# Patient Record
Sex: FEMALE | Race: WHITE | HISPANIC OR LATINO | Employment: OTHER | URBAN - METROPOLITAN AREA
[De-identification: names, ages, dates, MRNs, and addresses within clinical notes are randomized per-mention and may not be internally consistent; named-entity substitution may affect disease eponyms.]

---

## 2019-11-02 ENCOUNTER — HOSPITAL ENCOUNTER (EMERGENCY)
Facility: HOSPITAL | Age: 79
Discharge: HOME | End: 2019-11-02
Attending: EMERGENCY MEDICINE
Payer: MEDICARE

## 2019-11-02 VITALS
WEIGHT: 130 LBS | RESPIRATION RATE: 20 BRPM | BODY MASS INDEX: 27.29 KG/M2 | HEIGHT: 58 IN | DIASTOLIC BLOOD PRESSURE: 70 MMHG | OXYGEN SATURATION: 100 % | SYSTOLIC BLOOD PRESSURE: 133 MMHG | HEART RATE: 83 BPM | TEMPERATURE: 98 F

## 2019-11-02 DIAGNOSIS — N30.00 ACUTE CYSTITIS WITHOUT HEMATURIA: ICD-10-CM

## 2019-11-02 DIAGNOSIS — R05.9 COUGH: ICD-10-CM

## 2019-11-02 DIAGNOSIS — R06.02 SOB (SHORTNESS OF BREATH): Primary | ICD-10-CM

## 2019-11-02 DIAGNOSIS — J06.9 VIRAL URI: ICD-10-CM

## 2019-11-02 LAB
ALBUMIN SERPL BCP-MCNC: 4.3 G/DL (ref 3.5–5.2)
ALP SERPL-CCNC: 82 U/L (ref 55–135)
ALT SERPL W/O P-5'-P-CCNC: 19 U/L (ref 10–44)
ANION GAP SERPL CALC-SCNC: 13 MMOL/L (ref 8–16)
AST SERPL-CCNC: 25 U/L (ref 10–40)
BILIRUB SERPL-MCNC: 0.7 MG/DL (ref 0.1–1)
BILIRUB UR QL STRIP: NEGATIVE
BNP SERPL-MCNC: <10 PG/ML (ref 0–99)
BUN SERPL-MCNC: 10 MG/DL (ref 8–23)
CALCIUM SERPL-MCNC: 10.7 MG/DL (ref 8.7–10.5)
CHLORIDE SERPL-SCNC: 104 MMOL/L (ref 95–110)
CLARITY UR: CLEAR
CO2 SERPL-SCNC: 22 MMOL/L (ref 23–29)
COLOR UR: YELLOW
CREAT SERPL-MCNC: 0.8 MG/DL (ref 0.5–1.4)
ERYTHROCYTE [DISTWIDTH] IN BLOOD BY AUTOMATED COUNT: 13.3 % (ref 11.5–14.5)
EST. GFR  (AFRICAN AMERICAN): >60 ML/MIN/1.73 M^2
EST. GFR  (NON AFRICAN AMERICAN): >60 ML/MIN/1.73 M^2
GLUCOSE SERPL-MCNC: 119 MG/DL (ref 70–110)
GLUCOSE UR QL STRIP: NEGATIVE
HCT VFR BLD AUTO: 43.6 % (ref 37–48.5)
HGB BLD-MCNC: 14.3 G/DL (ref 12–16)
HGB UR QL STRIP: ABNORMAL
KETONES UR QL STRIP: NEGATIVE
LEUKOCYTE ESTERASE UR QL STRIP: ABNORMAL
MCH RBC QN AUTO: 29.1 PG (ref 27–31)
MCHC RBC AUTO-ENTMCNC: 32.8 G/DL (ref 32–36)
MCV RBC AUTO: 89 FL (ref 82–98)
MICROSCOPIC COMMENT: ABNORMAL
NITRITE UR QL STRIP: NEGATIVE
NON-SQ EPI CELLS #/AREA URNS HPF: 2 /HPF
PH UR STRIP: 7 [PH] (ref 5–8)
PLATELET # BLD AUTO: 292 K/UL (ref 150–350)
PMV BLD AUTO: 10.2 FL (ref 9.2–12.9)
POTASSIUM SERPL-SCNC: 4.1 MMOL/L (ref 3.5–5.1)
PROT SERPL-MCNC: 7.6 G/DL (ref 6–8.4)
PROT UR QL STRIP: NEGATIVE
RBC # BLD AUTO: 4.92 M/UL (ref 4–5.4)
RBC #/AREA URNS HPF: 1 /HPF (ref 0–4)
SODIUM SERPL-SCNC: 139 MMOL/L (ref 136–145)
SP GR UR STRIP: <=1.005 (ref 1–1.03)
SQUAMOUS #/AREA URNS HPF: 2 /HPF
TROPONIN I SERPL DL<=0.01 NG/ML-MCNC: 0.01 NG/ML (ref 0–0.03)
URN SPEC COLLECT METH UR: ABNORMAL
UROBILINOGEN UR STRIP-ACNC: NEGATIVE EU/DL
WBC # BLD AUTO: 5.82 K/UL (ref 3.9–12.7)
WBC #/AREA URNS HPF: 30 /HPF (ref 0–5)

## 2019-11-02 PROCEDURE — 80053 COMPREHEN METABOLIC PANEL: CPT

## 2019-11-02 PROCEDURE — 85027 COMPLETE CBC AUTOMATED: CPT

## 2019-11-02 PROCEDURE — 87186 SC STD MICRODIL/AGAR DIL: CPT

## 2019-11-02 PROCEDURE — 25000242 PHARM REV CODE 250 ALT 637 W/ HCPCS: Performed by: EMERGENCY MEDICINE

## 2019-11-02 PROCEDURE — 81000 URINALYSIS NONAUTO W/SCOPE: CPT

## 2019-11-02 PROCEDURE — 83880 ASSAY OF NATRIURETIC PEPTIDE: CPT

## 2019-11-02 PROCEDURE — 93010 ELECTROCARDIOGRAM REPORT: CPT | Mod: ,,, | Performed by: STUDENT IN AN ORGANIZED HEALTH CARE EDUCATION/TRAINING PROGRAM

## 2019-11-02 PROCEDURE — 84484 ASSAY OF TROPONIN QUANT: CPT

## 2019-11-02 PROCEDURE — 87077 CULTURE AEROBIC IDENTIFY: CPT

## 2019-11-02 PROCEDURE — 93005 ELECTROCARDIOGRAM TRACING: CPT

## 2019-11-02 PROCEDURE — 99285 EMERGENCY DEPT VISIT HI MDM: CPT | Mod: 25

## 2019-11-02 PROCEDURE — 94640 AIRWAY INHALATION TREATMENT: CPT

## 2019-11-02 PROCEDURE — 36000 PLACE NEEDLE IN VEIN: CPT

## 2019-11-02 PROCEDURE — 87086 URINE CULTURE/COLONY COUNT: CPT

## 2019-11-02 PROCEDURE — 87088 URINE BACTERIA CULTURE: CPT

## 2019-11-02 PROCEDURE — 93010 EKG 12-LEAD: ICD-10-PCS | Mod: ,,, | Performed by: STUDENT IN AN ORGANIZED HEALTH CARE EDUCATION/TRAINING PROGRAM

## 2019-11-02 RX ORDER — LOSARTAN POTASSIUM 100 MG/1
100 TABLET ORAL DAILY
COMMUNITY
End: 2022-11-06

## 2019-11-02 RX ORDER — ALBUTEROL SULFATE 2.5 MG/.5ML
5 SOLUTION RESPIRATORY (INHALATION)
Status: COMPLETED | OUTPATIENT
Start: 2019-11-02 | End: 2019-11-02

## 2019-11-02 RX ORDER — ALBUTEROL SULFATE 90 UG/1
1-2 AEROSOL, METERED RESPIRATORY (INHALATION) EVERY 6 HOURS PRN
Qty: 1 INHALER | Refills: 0 | Status: SHIPPED | OUTPATIENT
Start: 2019-11-02 | End: 2019-11-05

## 2019-11-02 RX ORDER — CIPROFLOXACIN 500 MG/1
500 TABLET ORAL 2 TIMES DAILY
Qty: 14 TABLET | Refills: 0 | Status: SHIPPED | OUTPATIENT
Start: 2019-11-02 | End: 2019-11-09

## 2019-11-02 RX ORDER — CHOLECALCIFEROL (VITAMIN D3) 25 MCG
1000 TABLET ORAL DAILY
COMMUNITY

## 2019-11-02 RX ADMIN — ALBUTEROL SULFATE 5 MG: 2.5 SOLUTION RESPIRATORY (INHALATION) at 05:11

## 2019-11-02 NOTE — ED NOTES
79 year old female presents to ed cc of sob x 3 days patient endorses intermittent dizziness denies cp patient awake alert ox4 in no acute distress at this time

## 2019-11-02 NOTE — ED PROVIDER NOTES
"Encounter Date: 11/2/2019    SCRIBE #1 NOTE: I, Avis Lawson, am scribing for, and in the presence of,  Dr. Coleman . I have scribed the entire note.       History     Chief Complaint   Patient presents with    Shortness of Breath     pt complains of weakness, SOB, and generalized body aches x 2 weeks, + nausea, pt reports recent Kidney infection"      Millie Pineda is a 79 y.o. female who  has a past medical history of Hypertension.    The patient presents to the ED due to worsening SOB over the past three days. She endorses associated fever, cough, "shakes", decreased appetite, decreased sleep, lower abdominal pain, and dysuria.   She denies any CP, nausea, vomiting, back/flank pain. She denies prior history of similar symptoms. She reports she was recently diagnosed with a "bacterial infection" and has completed a course of ABX.   Her daughter presents with her, who later adds she does get very anxious at times.    The history is provided by the patient.     Review of patient's allergies indicates:   Allergen Reactions    Pcn [penicillins] Other (See Comments)     " gives me headache"      Past Medical History:   Diagnosis Date    Hypertension      No past surgical history on file.  No family history on file.  Social History     Tobacco Use    Smoking status: Not on file   Substance Use Topics    Alcohol use: Not on file    Drug use: Not on file     Review of Systems   Constitutional: Positive for appetite change and fever. Negative for chills.   HENT: Negative for sore throat.    Respiratory: Positive for cough and shortness of breath.    Cardiovascular: Negative for chest pain.   Gastrointestinal: Positive for abdominal pain and nausea. Negative for vomiting.   Genitourinary: Negative for dysuria, frequency and urgency.   Musculoskeletal: Positive for myalgias. Negative for back pain.   Skin: Negative for rash and wound.   Neurological: Positive for tremors and weakness. Negative for syncope. "   Hematological: Does not bruise/bleed easily.   Psychiatric/Behavioral: Positive for sleep disturbance. Negative for agitation, behavioral problems and confusion. The patient is nervous/anxious.        Physical Exam     Initial Vitals [11/02/19 1654]   BP Pulse Resp Temp SpO2   127/70 95 19 97.5 °F (36.4 °C) 97 %      MAP       --         Physical Exam    Nursing note and vitals reviewed.  Constitutional: She appears well-developed and well-nourished. She is not diaphoretic. No distress.   Appears anxious    HENT:   Head: Normocephalic and atraumatic.   Mouth/Throat: Oropharynx is clear and moist.   Eyes: EOM are normal. Pupils are equal, round, and reactive to light.   Neck: No tracheal deviation present.   Cardiovascular: Normal rate, regular rhythm, normal heart sounds and intact distal pulses.   Pulmonary/Chest: Effort normal and breath sounds normal. No stridor. No respiratory distress. She has no decreased breath sounds. She has no wheezes. She has no rhonchi. She has no rales.   Respiratory effort is normal, but upon auscultation patient begins hyperventilating.   Lungs clear bilaterally.   Abdominal: Soft. Bowel sounds are normal. She exhibits no distension and no mass. There is no tenderness.   Musculoskeletal: Normal range of motion. She exhibits no edema.   No lower extremity edema    Neurological: She is alert and oriented to person, place, and time. She has normal strength. No cranial nerve deficit or sensory deficit.   Skin: Skin is warm and dry. Capillary refill takes less than 2 seconds. No pallor.   Psychiatric: Her behavior is normal. Thought content normal. Her mood appears anxious. Her speech is rapid and/or pressured.         ED Course   Procedures  Labs Reviewed   COMPREHENSIVE METABOLIC PANEL - Abnormal; Notable for the following components:       Result Value    CO2 22 (*)     Glucose 119 (*)     Calcium 10.7 (*)     All other components within normal limits   URINALYSIS, REFLEX TO URINE  CULTURE - Abnormal; Notable for the following components:    Specific Gravity, UA <=1.005 (*)     Occult Blood UA Trace (*)     Leukocytes, UA 3+ (*)     All other components within normal limits    Narrative:     Preferred Collection Type->Urine, Clean Catch   URINALYSIS MICROSCOPIC - Abnormal; Notable for the following components:    WBC, UA 30 (*)     Non-Squam Epith 2 (*)     All other components within normal limits    Narrative:     Preferred Collection Type->Urine, Clean Catch   CULTURE, URINE   CBC WITHOUT DIFFERENTIAL   B-TYPE NATRIURETIC PEPTIDE   TROPONIN I     EKG Readings: (Independently Interpreted)   NSR: rate of 86 bpm; no ST changes; no ischemia; normal intervals ; no prior EKG for comparison          X-Rays:   Independently Interpreted Readings:   Other Readings:  Reviewed by myself, read by radiology.     Imaging Results          X-Ray Chest AP Portable (Final result)  Result time 11/02/19 17:58:35    Final result by Jerry Gutierrez MD (11/02/19 17:58:35)                 Impression:      No acute process.      Electronically signed by: Jerry Gutierrez MD  Date:    11/02/2019  Time:    17:58             Narrative:    EXAMINATION:  XR CHEST AP PORTABLE    CLINICAL HISTORY:  shortness of breath;    TECHNIQUE:  Single frontal view of the chest was performed.    COMPARISON:  None    FINDINGS:  The trachea is unremarkable.  The cardiomediastinal silhouette is within normal limits.  The hilar structures are unremarkable.  The hemidiaphragms are unremarkable.  There is no evidence of free air beneath the hemidiaphragms.  There are no pleural effusions.  There is no evidence of a pneumothorax.  There is no evidence of pneumomediastinum.  No airspace opacity is present.  There are degenerative changes in the osseous structures.                              Medical Decision Making:   Differential Diagnosis:   Differential Diagnosis includes, but is not limited to:  PE, MI/ACS, pneumothorax, pericardial  effusion/tamonade, pneumonia, lung abscess, pericarditis/myocarditis, pleural effusion, lung mass, CHF exacerbation, asthma exacerbation, COPD exacerbation, aspirated/ingested foreign body, airway obstruction, CO poisoning, anemia, metabolic derangement, allergy/atopy, influenza, viral URI, viral syndrome.    Clinical Tests:   Lab Tests: Ordered and Reviewed  Radiological Study: Ordered and Reviewed  Medical Tests: Ordered and Reviewed                   ED Course as of Nov 03 1635   Sat Nov 02, 2019   1727 79-year-old female with history of hypertension on losartan presents to ED due to several days of shortness of breath, fatigue, decreased appetite, and decreased sleep.  Denies any history of asthma, COPD, or cardiac history.  On arrival, vitals unremarkable, exam benign, patient very anxious appearing, and hyperventilating on exam.  Will obtain cardiac evaluation, EKG, CXR, basic labs, and continue to monitor.    [SS]   1958 EKG without ischemia or arrhythmia.  CXR clear.  Labs unremarkable, BNP and troponin normal.  UA does reveal moderate UTI, greater than 30 WBCs.  Patient recently completed a course of Bactrim, and has a documented penicillin allergy.  Will prescribe ciprofloxacin.  Will also provide albuterol inhaler for possible viral URI.  ACS/PE highly unlikely given normal vitals and reassuring workup. Given patient's history of anxiety, suspect possible component of anxiety contributing to patient's symptoms.  Recommend close follow-up with PCP for further evaluation and management.      [SS]      ED Course User Index  [SS] Jorge Coleman MD     Clinical Impression:       ICD-10-CM ICD-9-CM   1. SOB (shortness of breath) R06.02 786.05   2. Acute cystitis without hematuria N30.00 595.0   3. Cough R05 786.2   4. Viral URI J06.9 465.9         Disposition:   Disposition: Discharged  Condition: Stable           I, Dr. Jorge Coleman, personally performed the services described in this documentation. All  medical record entries made by the scribe were at my direction and in my presence.  I have reviewed the chart and agree that the record reflects my personal performance and is accurate and complete.     Jorge Coleman MD.             Jorge Coleman MD  11/03/19 1738

## 2019-11-02 NOTE — ED NOTES
APPEARANCE: Alert, oriented and in no acute distress.  CARDIAC: Normal rate and rhythm, no murmur heard.   PERIPHERAL VASCULAR: peripheral pulses present. Normal cap refill. No edema. Warm to touch.    GASTRO: soft, bowel sounds normal, no tenderness, no abdominal distention.  MUSC: Full ROM. No bony tenderness or soft tissue tenderness. No obvious deformity.  SKIN: Skin is warm and dry, normal skin turgor, mucous membranes moist.  NEURO: 5/5 strength major flexors/extensors bilaterally. Sensory intact to light touch bilaterally. [place] coma scale: eyes open spontaneously-4, oriented & converses-5, obeys commands-6. No neurological abnormalities.   MENTAL STATUS: awake, alert and aware of environment.  EYE: PERRL, both eyes: pupils brisk and reactive to light. Normal size.  ENT: EARS: no obvious drainage. NOSE: no active bleeding.

## 2019-11-05 LAB — BACTERIA UR CULT: ABNORMAL

## 2022-05-14 ENCOUNTER — HOSPITAL ENCOUNTER (EMERGENCY)
Facility: HOSPITAL | Age: 82
Discharge: HOME | End: 2022-05-14
Attending: EMERGENCY MEDICINE
Payer: MEDICARE

## 2022-05-14 VITALS
OXYGEN SATURATION: 98 % | TEMPERATURE: 98 F | SYSTOLIC BLOOD PRESSURE: 125 MMHG | RESPIRATION RATE: 22 BRPM | BODY MASS INDEX: 29.26 KG/M2 | DIASTOLIC BLOOD PRESSURE: 78 MMHG | WEIGHT: 140 LBS | HEART RATE: 80 BPM

## 2022-05-14 DIAGNOSIS — R05.9 COUGH: ICD-10-CM

## 2022-05-14 DIAGNOSIS — J06.9 UPPER RESPIRATORY TRACT INFECTION, UNSPECIFIED TYPE: Primary | ICD-10-CM

## 2022-05-14 LAB
CTP QC/QA: YES
INFLUENZA A, MOLECULAR: NEGATIVE
INFLUENZA B, MOLECULAR: NEGATIVE
SARS-COV-2 RDRP RESP QL NAA+PROBE: NEGATIVE
SPECIMEN SOURCE: NORMAL

## 2022-05-14 PROCEDURE — 87502 INFLUENZA DNA AMP PROBE: CPT | Performed by: PHYSICIAN ASSISTANT

## 2022-05-14 PROCEDURE — 99284 EMERGENCY DEPT VISIT MOD MDM: CPT | Mod: 25

## 2022-05-14 PROCEDURE — 87502 INFLUENZA DNA AMP PROBE: CPT

## 2022-05-14 PROCEDURE — 25000003 PHARM REV CODE 250: Performed by: EMERGENCY MEDICINE

## 2022-05-14 PROCEDURE — U0002 COVID-19 LAB TEST NON-CDC: HCPCS | Performed by: PHYSICIAN ASSISTANT

## 2022-05-14 RX ORDER — BENZONATATE 100 MG/1
100 CAPSULE ORAL 3 TIMES DAILY PRN
Qty: 21 CAPSULE | Refills: 0 | Status: SHIPPED | OUTPATIENT
Start: 2022-05-14 | End: 2022-05-21

## 2022-05-14 RX ORDER — IPRATROPIUM BROMIDE 42 UG/1
2 SPRAY, METERED NASAL 4 TIMES DAILY PRN
Qty: 15 ML | Refills: 0 | Status: SHIPPED | OUTPATIENT
Start: 2022-05-14 | End: 2022-11-06

## 2022-05-14 RX ORDER — AZITHROMYCIN 250 MG/1
250 TABLET, FILM COATED ORAL DAILY
Qty: 6 TABLET | Refills: 0 | Status: SHIPPED | OUTPATIENT
Start: 2022-05-14 | End: 2022-11-06

## 2022-05-14 RX ORDER — ACETAMINOPHEN 500 MG
500 TABLET ORAL
Status: COMPLETED | OUTPATIENT
Start: 2022-05-14 | End: 2022-05-14

## 2022-05-14 RX ORDER — ACETAMINOPHEN 500 MG
500 TABLET ORAL EVERY 6 HOURS PRN
Qty: 20 TABLET | Refills: 0 | Status: SHIPPED | OUTPATIENT
Start: 2022-05-14

## 2022-05-14 RX ORDER — BENZONATATE 100 MG/1
100 CAPSULE ORAL ONCE
Status: COMPLETED | OUTPATIENT
Start: 2022-05-14 | End: 2022-05-14

## 2022-05-14 RX ADMIN — BENZONATATE 100 MG: 100 CAPSULE ORAL at 12:05

## 2022-05-14 RX ADMIN — ACETAMINOPHEN 500 MG: 500 TABLET ORAL at 12:05

## 2022-05-14 NOTE — ED PROVIDER NOTES
"Encounter Date: 5/14/2022       History     Chief Complaint   Patient presents with    COVID-19 Concerns     Pt c/o headache, cough, nasal irritation and chills x2 days     Millie Pineda is a 81 y.o. female who  has a past medical history of Hypertension.    The patient presents to the ED due to cough congestion headache for the past 3 days.  Patient has been taking Tylenol.  She states she took Tylenol last night but none today.  She had chills and fever last night however she is afebrile here.  She reports no shortness of breath or pain but reports nausea.  She is vaccinated against COVID however states that at her [facility] there are many unvaccinated people there.  No other concerns noted today.    The history is provided by the patient (A  was offered at no cost and declined.).     Review of patient's allergies indicates:   Allergen Reactions    Pcn [penicillins] Other (See Comments)     " gives me headache"      Past Medical History:   Diagnosis Date    Hypertension      No past surgical history on file.  No family history on file.     Review of Systems   Constitutional: Positive for chills and fever.   HENT: Positive for congestion. Negative for sore throat.    Respiratory: Positive for cough. Negative for shortness of breath.    Cardiovascular: Negative for chest pain.   Gastrointestinal: Positive for nausea. Negative for vomiting.   Genitourinary: Negative for dysuria, frequency and urgency.   Musculoskeletal: Negative for back pain, neck pain and neck stiffness.   Skin: Negative for rash and wound.   Neurological: Negative for syncope and weakness.   Hematological: Does not bruise/bleed easily.   Psychiatric/Behavioral: Negative for agitation, behavioral problems and confusion.       Physical Exam     Initial Vitals [05/14/22 1051]   BP Pulse Resp Temp SpO2   127/69 88 (!) 28 97.8 °F (36.6 °C) 96 %      MAP       --         Physical Exam    Nursing note and vitals " reviewed.  Constitutional: She appears well-developed and well-nourished. She is not diaphoretic. No distress.   HENT:   Head: Normocephalic and atraumatic.   Mouth/Throat: Oropharynx is clear and moist.   Eyes: EOM are normal. Pupils are equal, round, and reactive to light.   Neck: No tracheal deviation present.   Cardiovascular: Normal rate, regular rhythm, normal heart sounds and intact distal pulses.   Pulmonary/Chest: Breath sounds normal. No stridor. No respiratory distress. She has no wheezes.   Abdominal: Abdomen is soft. Bowel sounds are normal. She exhibits no distension and no mass. There is no abdominal tenderness.   Musculoskeletal:         General: No edema. Normal range of motion.     Neurological: She is alert and oriented to person, place, and time. No cranial nerve deficit or sensory deficit.   Skin: Skin is warm and dry. Capillary refill takes less than 2 seconds. No rash noted.   Psychiatric: She has a normal mood and affect. Her behavior is normal. Thought content normal.         ED Course   Procedures  Labs Reviewed   INFLUENZA A & B BY MOLECULAR   SARS-COV-2 RDRP GENE          Imaging Results          X-Ray Chest 1 View (Final result)  Result time 05/14/22 13:21:51    Final result by Vasile Bazan MD (05/14/22 13:21:51)                 Impression:      1. Pulmonary findings may reflect edema or other nonspecific pneumonitis noting shallow inspiratory effort.  Correlation is advised.      Electronically signed by: Vasile Bazan MD  Date:    05/14/2022  Time:    13:21             Narrative:    EXAMINATION:  XR CHEST 1 VIEW    CLINICAL HISTORY:  Cough, unspecified    TECHNIQUE:  Single frontal view of the chest was performed.    COMPARISON:  11/02/2019    FINDINGS:  The cardiomediastinal silhouette is not enlarged, magnified by technique..  There is no pleural effusion.  The trachea is midline.  The lungs are symmetrically expanded bilaterally with coarse interstitial attenuation.  No  large focal consolidation seen.  There is no pneumothorax.  The osseous structures are remarkable for degenerative changes.  Surgical change projects over the right upper quadrant..                                 Medications   benzonatate capsule 100 mg (100 mg Oral Given 5/14/22 1240)   acetaminophen tablet 500 mg (500 mg Oral Given 5/14/22 1240)     Medical Decision Making:   Differential Diagnosis:   Differential Diagnosis includes, but is not limited to:  Viral URI, Strep pharyngitis, viral pharyngitis, foreign body aspiration/ingestion, bronchitis, asthma exacerbation, CHF exacerbation, COPD exacerbation, allergy/atopy, influenza, pertussis, PE, pneumonia, lung abscess, fungal infection, TB, epiglottitis.    ED Management:  81-year-old female presenting with coryza cough.  Vital signs are stable.  She has no shortness of breath.  Chest x-ray demonstrates possible pneumonitis.  COVID and flu swabs are negative.  She appears stable for discharge today.  Discussed close follow-up with patient's PCP.  Return precautions for worsening symptoms shortness of breath fevers weakness or any other concerns.    After taking into careful account the historical factors and physical exam findings of the patient's presentation today, in conjunction with the empirical and objective data obtained on ED workup, no acute emergent medical condition has been identified. The patient appears to be low risk for an emergent medical condition and I feel it is safe and appropriate at this time for the patient to be discharged to follow-up as detailed in their discharge instructions for reevaluation and possible continued outpatient workup and management. I have discussed the specifics of the workup with the patient and the patient has verbalized understanding of the details of the workup, the diagnosis, the treatment plan, and the need for outpatient follow-up.  Although the patient has no emergent etiology today this does not preclude  the development of an emergent condition so in addition, I have advised the patient that they can return to the ED and/or activate EMS at any time with worsening of their symptoms, change of their symptoms, or with any other medical complaint.  The patient remained comfortable and stable during their visit in the ED.  Discharge and follow-up instructions discussed with the patient who expressed understanding and willingness to comply with my recommendations.                        Clinical Impression:   Final diagnoses:  [R05.9] Cough  [J06.9] Upper respiratory tract infection, unspecified type (Primary)          ED Disposition Condition    Discharge Stable        ED Prescriptions     Medication Sig Dispense Start Date End Date Auth. Provider    benzonatate (TESSALON) 100 MG capsule Take 1 capsule (100 mg total) by mouth 3 (three) times daily as needed for Cough. 21 capsule 5/14/2022 5/21/2022 Evan Arguello Jr., MD    azithromycin (Z-NIMISHA) 250 MG tablet Take 1 tablet (250 mg total) by mouth once daily. Take first 2 tablets together, then 1 every day until finished. 6 tablet 5/14/2022  Evan Arguello Jr., MD    ipratropium (ATROVENT) 42 mcg (0.06 %) nasal spray 2 sprays by Nasal route 4 (four) times daily as needed for Rhinitis. 15 mL 5/14/2022  Evan Arguello Jr., MD    acetaminophen (TYLENOL) 500 MG tablet Take 1 tablet (500 mg total) by mouth every 6 (six) hours as needed for Pain. 20 tablet 5/14/2022  Evan Arguello Jr., MD        Follow-up Information     Follow up With Specialties Details Why Contact Info    Chacorta Santana MD Internal Medicine In 3 days  [address] [facility]  [facility]  [place] [id number]  [phone]          Portions of this note were dictated using voice recognition software and may contain dictation related errors in spelling/grammar/syntax not found on text review       Evan Arguello Jr., MD  05/15/22 [id number]

## 2022-05-14 NOTE — ED NOTES
Pt in room 3 from waiting room. Pt has headache, cough and congestion for a few days. Pt dressed in gown and able to speak in complete sentences without shortness of breath. Head to toe assessment completed, plan of care reviewed, call bell within reach.

## 2022-05-14 NOTE — ED NOTES
Pt given discharge, follow up  Instructions and prescriptions. Pt verbalized understanding. Pt ambulated out of ER with steady gait in stable condition.

## 2022-08-01 ENCOUNTER — HOSPITAL ENCOUNTER (OUTPATIENT)
Dept: RADIOLOGY | Facility: HOSPITAL | Age: 82
Discharge: HOME | End: 2022-08-01
Attending: PODIATRIST
Payer: MEDICARE

## 2022-08-01 ENCOUNTER — OFFICE VISIT (OUTPATIENT)
Dept: PODIATRY | Facility: CLINIC | Age: 82
End: 2022-08-01
Payer: MEDICARE

## 2022-08-01 VITALS
DIASTOLIC BLOOD PRESSURE: 71 MMHG | SYSTOLIC BLOOD PRESSURE: 130 MMHG | HEART RATE: 93 BPM | BODY MASS INDEX: 29.26 KG/M2 | HEIGHT: 58 IN

## 2022-08-01 DIAGNOSIS — M79.671 PAIN OF RIGHT HEEL: ICD-10-CM

## 2022-08-01 DIAGNOSIS — M72.2 PLANTAR FASCIITIS, RIGHT: ICD-10-CM

## 2022-08-01 DIAGNOSIS — M79.671 PAIN OF RIGHT HEEL: Primary | ICD-10-CM

## 2022-08-01 DIAGNOSIS — B07.0 PLANTAR WART, RIGHT FOOT: ICD-10-CM

## 2022-08-01 PROCEDURE — 1159F PR MEDICATION LIST DOCUMENTED IN MEDICAL RECORD: ICD-10-PCS | Mod: CPTII,S$GLB,, | Performed by: PODIATRIST

## 2022-08-01 PROCEDURE — 1160F RVW MEDS BY RX/DR IN RCRD: CPT | Mod: CPTII,S$GLB,, | Performed by: PODIATRIST

## 2022-08-01 PROCEDURE — 99999 PR PBB SHADOW E&M-EST. PATIENT-LVL III: ICD-10-PCS | Mod: PBBFAC,,, | Performed by: PODIATRIST

## 2022-08-01 PROCEDURE — 99203 OFFICE O/P NEW LOW 30 MIN: CPT | Mod: 25,S$GLB,, | Performed by: PODIATRIST

## 2022-08-01 PROCEDURE — 20550 NJX 1 TENDON SHEATH/LIGAMENT: CPT | Mod: RT,S$GLB,, | Performed by: PODIATRIST

## 2022-08-01 PROCEDURE — 73650 X-RAY EXAM OF HEEL: CPT | Mod: TC,PN,RT

## 2022-08-01 PROCEDURE — 3075F SYST BP GE 130 - 139MM HG: CPT | Mod: CPTII,S$GLB,, | Performed by: PODIATRIST

## 2022-08-01 PROCEDURE — 3078F PR MOST RECENT DIASTOLIC BLOOD PRESSURE < 80 MM HG: ICD-10-PCS | Mod: CPTII,S$GLB,, | Performed by: PODIATRIST

## 2022-08-01 PROCEDURE — 3075F PR MOST RECENT SYSTOLIC BLOOD PRESS GE 130-139MM HG: ICD-10-PCS | Mod: CPTII,S$GLB,, | Performed by: PODIATRIST

## 2022-08-01 PROCEDURE — 99203 PR OFFICE/OUTPT VISIT, NEW, LEVL III, 30-44 MIN: ICD-10-PCS | Mod: 25,S$GLB,, | Performed by: PODIATRIST

## 2022-08-01 PROCEDURE — 20550 PR INJECT TENDON SHEATH/LIGAMENT: ICD-10-PCS | Mod: RT,S$GLB,, | Performed by: PODIATRIST

## 2022-08-01 PROCEDURE — 1125F PR PAIN SEVERITY QUANTIFIED, PAIN PRESENT: ICD-10-PCS | Mod: CPTII,S$GLB,, | Performed by: PODIATRIST

## 2022-08-01 PROCEDURE — 1125F AMNT PAIN NOTED PAIN PRSNT: CPT | Mod: CPTII,S$GLB,, | Performed by: PODIATRIST

## 2022-08-01 PROCEDURE — 1159F MED LIST DOCD IN RCRD: CPT | Mod: CPTII,S$GLB,, | Performed by: PODIATRIST

## 2022-08-01 PROCEDURE — 73650 XR CALCANEUS 2 VIEW RIGHT: ICD-10-PCS | Mod: 26,RT,, | Performed by: RADIOLOGY

## 2022-08-01 PROCEDURE — 1160F PR REVIEW ALL MEDS BY PRESCRIBER/CLIN PHARMACIST DOCUMENTED: ICD-10-PCS | Mod: CPTII,S$GLB,, | Performed by: PODIATRIST

## 2022-08-01 PROCEDURE — 73650 X-RAY EXAM OF HEEL: CPT | Mod: 26,RT,, | Performed by: RADIOLOGY

## 2022-08-01 PROCEDURE — 99999 PR PBB SHADOW E&M-EST. PATIENT-LVL III: CPT | Mod: PBBFAC,,, | Performed by: PODIATRIST

## 2022-08-01 PROCEDURE — 3078F DIAST BP <80 MM HG: CPT | Mod: CPTII,S$GLB,, | Performed by: PODIATRIST

## 2022-08-01 RX ORDER — METHYLPREDNISOLONE ACETATE 40 MG/ML
40 INJECTION, SUSPENSION INTRA-ARTICULAR; INTRALESIONAL; INTRAMUSCULAR; SOFT TISSUE
Status: COMPLETED | OUTPATIENT
Start: 2022-08-01 | End: 2022-08-01

## 2022-08-01 RX ADMIN — METHYLPREDNISOLONE ACETATE 40 MG: 40 INJECTION, SUSPENSION INTRA-ARTICULAR; INTRALESIONAL; INTRAMUSCULAR; SOFT TISSUE at 09:08

## 2022-08-01 NOTE — PATIENT INSTRUCTIONS
Wear tennis shoes with arch support such as Sketchers Go Walk or Arch support shoes.    Avoid barefoot walking.      This exercise is designed to stretch and strengthen your feet and ankles. Before beginning the exercise, read through all the instructions. While exercising, breathe normally and dont bounce. If you feel any pain, stop the exercise. If pain persists, inform your healthcare provider:  Stand an arms length away from a wall. Place the palms of your hands on the wall. Step forward about 12 inches with your ______ foot.  Keeping toes pointed forward and both heels on the floor, bend both knees and lean forward. Hold for __30____ seconds. Relax.  Repeat ___3___ times. Do ___2-3___ sets a day.  Date Last Reviewed: 8/16/2015  © 8937-3254 [employer]. [address] [place] [address], [place] [address]. All rights reserved. This information is not intended as a substitute for professional medical care. Always follow your healthcare professional's instructions.

## 2022-08-01 NOTE — PROGRESS NOTES
"Subjective:      Patient ID: Millie Pineda is a 82 y.o. female.    Chief Complaint: Foot Problem (Right foot ) and PCP (No PCP)    Presents today complaining of pain along the bottom of the right heel for the past month for so.  She was treated per her orthopedic spine specialist who performed an x-ray which showed a plantar heel spur and she received injection that gave her about a week of relief.  Relates pain mostly with standing or walking along the bottom the heel.  Denies any trauma or changes in activity.  She refused translation per [employer] language services.    Vitals:    08/01/22 0916   BP: 130/71   Pulse: 93   Height: 4' 10" (1.473 m)   PainSc: 10-Worst pain ever   PainLoc: Foot      Past Medical History:   Diagnosis Date    Hypertension        History reviewed. No pertinent surgical history.    History reviewed. No pertinent family history.    Social History     Socioeconomic History    Marital status:        Current Outpatient Medications   Medication Sig Dispense Refill    acetaminophen (TYLENOL) 500 MG tablet Take 1 tablet (500 mg total) by mouth every 6 (six) hours as needed for Pain. 20 tablet 0    ipratropium (ATROVENT) 42 mcg (0.06 %) nasal spray 2 sprays by Nasal route 4 (four) times daily as needed for Rhinitis. 15 mL 0    losartan (COZAAR) 100 MG tablet Take 100 mg by mouth once daily.      vitamin D (VITAMIN D3) 1000 units Tab Take 1,000 Units by mouth once daily.      albuterol (PROVENTIL/VENTOLIN HFA) 90 mcg/actuation inhaler Inhale 1-2 puffs into the lungs every 6 (six) hours as needed for Shortness of Breath. Rescue 1 Inhaler 0    azithromycin (Z-NIMISHA) 250 MG tablet Take 1 tablet (250 mg total) by mouth once daily. Take first 2 tablets together, then 1 every day until finished. (Patient not taking: Reported on 8/1/2022) 6 tablet 0     No current facility-administered medications for this visit.       Review of patient's allergies indicates:   Allergen Reactions    Pcn " "[penicillins] Other (See Comments)     " gives me headache"          Review of Systems   Constitutional: Negative for chills, fever and malaise/fatigue.   HENT: Negative for congestion and hearing loss.    Cardiovascular: Negative for chest pain, claudication and leg swelling.   Respiratory: Negative for cough and shortness of breath.    Skin: Negative for color change.   Musculoskeletal: Positive for back pain. Negative for joint pain, muscle cramps and muscle weakness.   Gastrointestinal: Negative for nausea and vomiting.   Neurological: Negative for numbness, paresthesias and weakness.   Psychiatric/Behavioral: Negative for altered mental status.           Objective:      Physical Exam  Constitutional:       General: She is not in acute distress.     Appearance: Normal appearance. She is not ill-appearing.   Cardiovascular:      Pulses:           Dorsalis pedis pulses are 2+ on the right side and 2+ on the left side.        Posterior tibial pulses are 2+ on the right side and 2+ on the left side.   Musculoskeletal:      Comments:   Localized pain on palpation to the plantar medial and central right heel overlying the calcaneal tuber at the plantar fascia origin.  No pain with medial and lateral squeeze test of the right heel.  No pain with range of motion or manual muscle strength testing right foot ankle.  Range of motion to the ankle bilateral is noted to have 0° of dorsiflexion with the knee extended and improves with knee flexion.  Rectus appearing foot type bilateral.   Skin:     General: Skin is warm.      Capillary Refill: Capillary refill takes less than 2 seconds.      Findings: No ecchymosis or erythema.      Nails: There is no clubbing.      Comments:   Plantar right heel area of slightly raised hyperkeratotic tissue with central spongy core as very superficial however there is disappearance the resting skin tension lines consistent with a plantar wart.   Neurological:      Mental Status: She is alert " and oriented to person, place, and time.      Sensory: Sensation is intact.      Motor: Motor function is intact.               Assessment:       Encounter Diagnoses   Name Primary?    Pain of right heel Yes    Plantar fasciitis, right     Plantar wart, right foot          Plan:       Millie was seen today for foot problem and pcp.    Diagnoses and all orders for this visit:    Pain of right heel  -     X-Ray Calcaneus 2 View Right; Future    Plantar fasciitis, right    Plantar wart, right foot    Other orders  -     methylPREDNISolone acetate injection 40 mg      I counseled the patient on her conditions, their implications and medical management.      We discussed obtaining another baseline right calcaneus x-ray since we cannot obtain the previous 1.      We discussed conservative care options such as rest, ice elevation.  We also discussed stretching and avoiding barefoot walking recommending a small heel at all times.  We discussed wearing shoes more adequate support since her current shoe gear lack support.  We also discussed other corticosteroid injection to help reduce the inflammation pain at the plantar fascia origin right heel.  The patient elected proceed with the injection.      The patient's verbal consent the skin was prepped with alcohol to the plantar right heel followed by skin anesthesia with ethyl chloride.  Injected mixture containing 40 mg Depo-Medrol 1 mL 0.25% plain Marcaine to the affected area.  She tolerated procedure well without apparent complication.      RTC within 4-6 weeks or p.r.n. as discussed.  Also discussed treatment for the plantar  Wart if it continues to bother her.    A portion of this note was generated by voice recognition software and may contain spelling and grammar errors.      .

## 2022-09-26 ENCOUNTER — OFFICE VISIT (OUTPATIENT)
Dept: PODIATRY | Facility: CLINIC | Age: 82
End: 2022-09-26
Payer: MEDICARE

## 2022-09-26 VITALS
DIASTOLIC BLOOD PRESSURE: 59 MMHG | HEIGHT: 58 IN | HEART RATE: 83 BPM | WEIGHT: 140 LBS | SYSTOLIC BLOOD PRESSURE: 100 MMHG | BODY MASS INDEX: 29.39 KG/M2

## 2022-09-26 DIAGNOSIS — M77.51 RIGHT ANKLE TENDONITIS: Primary | ICD-10-CM

## 2022-09-26 DIAGNOSIS — M72.2 PLANTAR FASCIITIS, RIGHT: ICD-10-CM

## 2022-09-26 DIAGNOSIS — M79.671 PAIN OF RIGHT HEEL: ICD-10-CM

## 2022-09-26 PROCEDURE — 1159F MED LIST DOCD IN RCRD: CPT | Mod: CPTII,S$GLB,, | Performed by: PODIATRIST

## 2022-09-26 PROCEDURE — 1125F PR PAIN SEVERITY QUANTIFIED, PAIN PRESENT: ICD-10-PCS | Mod: CPTII,S$GLB,, | Performed by: PODIATRIST

## 2022-09-26 PROCEDURE — 3288F PR FALLS RISK ASSESSMENT DOCUMENTED: ICD-10-PCS | Mod: CPTII,S$GLB,, | Performed by: PODIATRIST

## 2022-09-26 PROCEDURE — 3074F SYST BP LT 130 MM HG: CPT | Mod: CPTII,S$GLB,, | Performed by: PODIATRIST

## 2022-09-26 PROCEDURE — 1159F PR MEDICATION LIST DOCUMENTED IN MEDICAL RECORD: ICD-10-PCS | Mod: CPTII,S$GLB,, | Performed by: PODIATRIST

## 2022-09-26 PROCEDURE — 3078F PR MOST RECENT DIASTOLIC BLOOD PRESSURE < 80 MM HG: ICD-10-PCS | Mod: CPTII,S$GLB,, | Performed by: PODIATRIST

## 2022-09-26 PROCEDURE — 20550 NJX 1 TENDON SHEATH/LIGAMENT: CPT | Mod: RT,S$GLB,, | Performed by: PODIATRIST

## 2022-09-26 PROCEDURE — 99214 OFFICE O/P EST MOD 30 MIN: CPT | Mod: 25,S$GLB,, | Performed by: PODIATRIST

## 2022-09-26 PROCEDURE — 99999 PR PBB SHADOW E&M-EST. PATIENT-LVL III: ICD-10-PCS | Mod: PBBFAC,,, | Performed by: PODIATRIST

## 2022-09-26 PROCEDURE — 1101F PT FALLS ASSESS-DOCD LE1/YR: CPT | Mod: CPTII,S$GLB,, | Performed by: PODIATRIST

## 2022-09-26 PROCEDURE — 1160F PR REVIEW ALL MEDS BY PRESCRIBER/CLIN PHARMACIST DOCUMENTED: ICD-10-PCS | Mod: CPTII,S$GLB,, | Performed by: PODIATRIST

## 2022-09-26 PROCEDURE — 3078F DIAST BP <80 MM HG: CPT | Mod: CPTII,S$GLB,, | Performed by: PODIATRIST

## 2022-09-26 PROCEDURE — 1125F AMNT PAIN NOTED PAIN PRSNT: CPT | Mod: CPTII,S$GLB,, | Performed by: PODIATRIST

## 2022-09-26 PROCEDURE — 3074F PR MOST RECENT SYSTOLIC BLOOD PRESSURE < 130 MM HG: ICD-10-PCS | Mod: CPTII,S$GLB,, | Performed by: PODIATRIST

## 2022-09-26 PROCEDURE — 1101F PR PT FALLS ASSESS DOC 0-1 FALLS W/OUT INJ PAST YR: ICD-10-PCS | Mod: CPTII,S$GLB,, | Performed by: PODIATRIST

## 2022-09-26 PROCEDURE — 3288F FALL RISK ASSESSMENT DOCD: CPT | Mod: CPTII,S$GLB,, | Performed by: PODIATRIST

## 2022-09-26 PROCEDURE — 1160F RVW MEDS BY RX/DR IN RCRD: CPT | Mod: CPTII,S$GLB,, | Performed by: PODIATRIST

## 2022-09-26 PROCEDURE — 99214 PR OFFICE/OUTPT VISIT, EST, LEVL IV, 30-39 MIN: ICD-10-PCS | Mod: 25,S$GLB,, | Performed by: PODIATRIST

## 2022-09-26 PROCEDURE — 20550 PR INJECT TENDON SHEATH/LIGAMENT: ICD-10-PCS | Mod: RT,S$GLB,, | Performed by: PODIATRIST

## 2022-09-26 PROCEDURE — 99999 PR PBB SHADOW E&M-EST. PATIENT-LVL III: CPT | Mod: PBBFAC,,, | Performed by: PODIATRIST

## 2022-09-26 RX ORDER — TRIAMCINOLONE ACETONIDE 40 MG/ML
40 INJECTION, SUSPENSION INTRA-ARTICULAR; INTRAMUSCULAR
Status: COMPLETED | OUTPATIENT
Start: 2022-09-26 | End: 2022-09-26

## 2022-09-26 RX ADMIN — TRIAMCINOLONE ACETONIDE 40 MG: 40 INJECTION, SUSPENSION INTRA-ARTICULAR; INTRAMUSCULAR at 09:09

## 2022-09-26 NOTE — PROGRESS NOTES
"Subjective:      Patient ID: Millie Pineda is a 82 y.o. female.    Chief Complaint: Foot Pain (Follow up pain to right foot)    Presents today complaining of pain along the bottom of the right heel for the past month for so.  She was treated per her orthopedic spine specialist who performed an x-ray which showed a plantar heel spur and she received injection that gave her about a week of relief.  Relates pain mostly with standing or walking along the bottom the heel.  Denies any trauma or changes in activity.  She refused translation per [email or web address] language services.    09/26/2022:  Follow-up for plantar fasciitis the right heel post corticosteroid injection.  States that she got approximately 3-4 days relief following the injection and then the pain gradually recurred.  She purchased new tennis shoes and has been trying to modify her activities.    Vitals:    09/26/22 0939   BP: (!) 100/59   Pulse: 83   Weight: 63.5 kg (140 lb)   Height: 4' 10" (1.473 m)   PainSc: 10-Worst pain ever   PainLoc: Foot      Past Medical History:   Diagnosis Date    Hypertension        History reviewed. No pertinent surgical history.    Family History   Problem Relation Age of Onset    No Known Problems Mother     No Known Problems Father        Social History     Socioeconomic History    Marital status:    Tobacco Use    Smoking status: Never    Smokeless tobacco: Never       Current Outpatient Medications   Medication Sig Dispense Refill    acetaminophen (TYLENOL) 500 MG tablet Take 1 tablet (500 mg total) by mouth every 6 (six) hours as needed for Pain. 20 tablet 0    ipratropium (ATROVENT) 42 mcg (0.06 %) nasal spray 2 sprays by Nasal route 4 (four) times daily as needed for Rhinitis. 15 mL 0    losartan (COZAAR) 100 MG tablet Take 100 mg by mouth once daily.      vitamin D (VITAMIN D3) 1000 units Tab Take 1,000 Units by mouth once daily.      albuterol (PROVENTIL/VENTOLIN HFA) 90 mcg/actuation inhaler Inhale 1-2 puffs into the " "lungs every 6 (six) hours as needed for Shortness of Breath. Rescue 1 Inhaler 0    azithromycin (Z-NIMISHA) 250 MG tablet Take 1 tablet (250 mg total) by mouth once daily. Take first 2 tablets together, then 1 every day until finished. (Patient not taking: Reported on 9/26/2022) 6 tablet 0     No current facility-administered medications for this visit.       Review of patient's allergies indicates:   Allergen Reactions    Pcn [penicillins] Other (See Comments)     " gives me headache"          Review of Systems   Constitutional: Negative for chills, fever and malaise/fatigue.   HENT:  Negative for congestion and hearing loss.    Cardiovascular:  Negative for chest pain, claudication and leg swelling.   Respiratory:  Negative for cough and shortness of breath.    Skin:  Negative for color change.   Musculoskeletal:  Positive for back pain. Negative for joint pain, muscle cramps and muscle weakness.   Gastrointestinal:  Negative for nausea and vomiting.   Neurological:  Negative for numbness, paresthesias and weakness.   Psychiatric/Behavioral:  Negative for altered mental status.          Objective:      Physical Exam  Constitutional:       General: She is not in acute distress.     Appearance: Normal appearance. She is not ill-appearing.   Cardiovascular:      Pulses:           Dorsalis pedis pulses are 2+ on the right side and 2+ on the left side.        Posterior tibial pulses are 2+ on the right side and 2+ on the left side.   Musculoskeletal:      Comments:   Localized pain on palpation to the plantar medial and central right heel overlying the calcaneal tuber at the plantar fascia origin.  Mild pain with medial and lateral squeeze test of the right heel.  Pain on palpation overlying the course of the peroneal tendons commencing posterior to the lateral malleolus and extending to the lateral hindfoot.  There is some mild pain aggravated with active eversion of the right foot in the neutral plantar flexed position " overlying the course of the peroneal tendons.  There is also pain on palpation overlying the posterior tibial tendon inferior to the medial malleolus and extending towards the navicular tuberosity.  No pain with active inversion of the right foot.  Negative anterior drawer sign right ankle.  Only slight pain with stress eversion of the right ankle and no pain with stress inversion.  Range of motion to the ankle bilateral is noted to have 0° of dorsiflexion with the knee extended and improves with knee flexion.  Rectus appearing foot type bilateral.   Skin:     General: Skin is warm.      Capillary Refill: Capillary refill takes less than 2 seconds.      Findings: No ecchymosis or erythema.      Nails: There is no clubbing.   Neurological:      Mental Status: She is alert and oriented to person, place, and time.      Sensory: Sensation is intact.      Motor: Motor function is intact.             Assessment:       Encounter Diagnoses   Name Primary?    Right ankle tendonitis Yes    Pain of right heel     Plantar fasciitis, right          Plan:       Millie was seen today for foot pain.    Diagnoses and all orders for this visit:    Right ankle tendonitis  -     Ambulatory referral/consult to Physical/Occupational Therapy; Future    Pain of right heel  -     Ambulatory referral/consult to Physical/Occupational Therapy; Future    Plantar fasciitis, right  -     Ambulatory referral/consult to Physical/Occupational Therapy; Future    Other orders  -     triamcinolone acetonide injection 40 mg    I counseled the patient on her conditions, their implications and medical management.    Previous right calcaneus x-ray confirmed a small plantar calcaneal enthesopathy and moderate-sized posterior calcaneal enthesopathy.    Patient appears to be getting guarding from modifying the way she walks and trying to walk on the lateral side of the foot.    Dispense, fitted and applied tall Cam boot to the right lower extremity to help  offload.    We discussed another corticosteroid injection followed by rest, ice and elevation with referral physical therapy to help reduce the pain and inflammation overlying the peroneal/posterior tibial tendons and plantar fascia regions right foot and ankle.    We discussed conservative care options such as rest, ice elevation.  We also discussed stretching and avoiding barefoot walking recommending a small heel at all times.  We discussed wearing shoes more adequate support since her current shoe gear lack support.  We also discussed other corticosteroid injection to help reduce the inflammation and pain at the plantar fascia origin right heel.  The patient elected proceed with the injection.      The patient's verbal consent the skin was prepped with alcohol to the plantar right heel followed by skin anesthesia with ethyl chloride.  Injected mixture containing 40 mg Depo-Medrol 1 mL 0.25% plain Marcaine to the affected area.  She tolerated procedure well without apparent complication.      RTC within 6 weeks or p.r.n. as discussed.  Patient was instructed not to drive with the boot.  She may remove it and use her tennis shoe for driving purposes.  We discussed gradually weaning out of the orthopedic boot into a tennis shoe within 3-4 weeks with gradual increase in activity as tolerated.  If she does not continue to improve and consider follow-up MRI and possible surgical intervention.    A portion of this note was generated by voice recognition software and may contain spelling and grammar errors.      .

## 2022-09-26 NOTE — PATIENT INSTRUCTIONS
Wear the orthopedic boot for 3-4 weeks then May begin gradual 30 minute daily increments to transition to a tennis shoe.    May participate in low impact exercise such as stationary bike, elliptical and swimming.     Avoid high impact activities such as running, jumping and squatting.

## 2022-11-04 ENCOUNTER — OFFICE VISIT (OUTPATIENT)
Dept: FAMILY MEDICINE | Facility: HOSPITAL | Age: 82
End: 2022-11-04
Payer: MEDICARE

## 2022-11-04 DIAGNOSIS — E16.2 LOW BLOOD SUGAR: Primary | ICD-10-CM

## 2022-11-04 DIAGNOSIS — M77.51 RIGHT ANKLE TENDONITIS: ICD-10-CM

## 2022-11-04 DIAGNOSIS — R45.86 MOOD CHANGE: ICD-10-CM

## 2022-11-04 DIAGNOSIS — Z00.00 ENCOUNTER FOR ANNUAL HEALTH EXAMINATION: ICD-10-CM

## 2022-11-04 DIAGNOSIS — R53.83 FATIGUE, UNSPECIFIED TYPE: ICD-10-CM

## 2022-11-04 DIAGNOSIS — Z23 ENCOUNTER FOR IMMUNIZATION: ICD-10-CM

## 2022-11-04 DIAGNOSIS — E78.5 HYPERLIPIDEMIA, UNSPECIFIED HYPERLIPIDEMIA TYPE: ICD-10-CM

## 2022-11-04 DIAGNOSIS — Z11.59 ENCOUNTER FOR HEPATITIS C SCREENING TEST FOR LOW RISK PATIENT: ICD-10-CM

## 2022-11-04 DIAGNOSIS — Z11.4 SCREENING FOR HIV (HUMAN IMMUNODEFICIENCY VIRUS): ICD-10-CM

## 2022-11-04 PROCEDURE — G0008 ADMIN INFLUENZA VIRUS VAC: HCPCS

## 2022-11-04 PROCEDURE — 99213 OFFICE O/P EST LOW 20 MIN: CPT | Performed by: STUDENT IN AN ORGANIZED HEALTH CARE EDUCATION/TRAINING PROGRAM

## 2022-11-04 RX ORDER — ATORVASTATIN CALCIUM 10 MG/1
10 TABLET, FILM COATED ORAL DAILY
COMMUNITY
Start: 2022-10-19

## 2022-11-04 RX ORDER — DICLOFENAC SODIUM 10 MG/G
GEL TOPICAL
COMMUNITY
Start: 2022-10-19

## 2022-11-04 RX ORDER — GABAPENTIN 400 MG/1
400 CAPSULE ORAL NIGHTLY
COMMUNITY
Start: 2022-08-29

## 2022-11-04 NOTE — PROGRESS NOTES
High dose flu vaccine administered R deltoid IM.    VIS given-pt instructed to wait 15 minutes.

## 2022-11-04 NOTE — PROGRESS NOTES
"  History & Physical  [facility]      SUBJECTIVE:     Millie Pineda is a 82 y.o. year old female with PMHx of HTN who presents to to establish care:    Patient is concerned because she believes she has been having low blood sugars.  She reports she had been taking metformin, but recently stopped due to these low blood sugars. Of she reports a history of elevated sugars/prediabetes and reports that she was started on a diabetes medication (metformin). She also reports that she had to go to the hospital one time due to these low blood sugars.  She denies any lightheadedness, dizziness, or HA s at the time. She is here now to establish with a new physician for evaluation of these low blood sugars.  Patient recently saw podiatrist for right ankle tendinitis.  She reports that she uses and insoles and a well brace."She reports that the foot pain is better but still bothers her.  Patient reports that she sometimes has low energy levels and feels depressed at times. However, the symptoms do not bother her too much.  In terms of her medications, she reports she takes atorvastatin 10 mg for hyperlipidemia.  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 She reports the gabapentin helps her.  Patient also reports that she has actually discontinued the losartan that is on her med list.  She denies CP, SOB, NVD.    There are no problems to display for this patient.       (Not in a hospital admission)    Review of patient's allergies indicates:   Allergen Reactions    Pcn [penicillins] Other (See Comments)     " gives me headache"         Past Medical History:   Diagnosis Date    Hypertension       No past surgical history on file.   Family History   Problem Relation Age of Onset    No Known Problems Mother     No Known Problems Father       Social History     Tobacco Use    Smoking " "status: Never    Smokeless tobacco: Never   Substance Use Topics    Alcohol use: Not on file      Review of Systems   Constitutional: Negative for fever and malaise/fatigue.   HENT:  Negative for congestion and sore throat.    Eyes:  Negative for blurred vision and visual disturbance.   Cardiovascular:  Negative for chest pain and leg swelling.   Respiratory:  Negative for cough and shortness of breath.    Musculoskeletal:  Positive for joint pain. Negative for arthritis, back pain and stiffness.   Gastrointestinal:  Negative for abdominal pain, diarrhea, nausea and vomiting.   Genitourinary:  Negative for dysuria and hematuria.   Neurological:  Negative for dizziness, headaches and light-headedness.   Psychiatric/Behavioral:  Negative for depression. The patient does not have insomnia and is not nervous/anxious.      OBJECTIVE:     There were no vitals filed for this visit.  Estimated body mass index is 29.26 kg/m² as calculated from the following:    Height as of 9/26/22: 4' 10" (1.473 m).    Weight as of 9/26/22: 63.5 kg (140 lb).     Physical Exam  Constitutional:       Appearance: Normal appearance. She is normal weight.   HENT:      Head: Normocephalic and atraumatic.      Right Ear: External ear normal.      Left Ear: External ear normal.      Mouth/Throat:      Mouth: Mucous membranes are moist.      Pharynx: Oropharynx is clear.   Eyes:      Extraocular Movements: Extraocular movements intact.      Conjunctiva/sclera: Conjunctivae normal.      Pupils: Pupils are equal, round, and reactive to light.   Cardiovascular:      Rate and Rhythm: Normal rate and regular rhythm.      Pulses: Normal pulses.      Heart sounds: Normal heart sounds.   Pulmonary:      Effort: Pulmonary effort is normal.      Breath sounds: Normal breath sounds.   Abdominal:      General: Abdomen is flat. Bowel sounds are normal. There is no distension.      Tenderness: There is no abdominal tenderness. There is no guarding. "   Musculoskeletal:      Cervical back: Normal range of motion.      Right lower leg: No edema.      Left lower leg: No edema.   Skin:     Capillary Refill: Capillary refill takes less than 2 seconds.   Neurological:      Mental Status: She is alert and oriented to person, place, and time. Mental status is at baseline.      Gait: Gait abnormal.      Comments: Limps and favors L side   Psychiatric:         Mood and Affect: Mood normal.         Behavior: Behavior normal.         Thought Content: Thought content normal.     Labs:    Lab Results   Component Value Date    WBC 5.82 11/02/2019    HGB 14.3 11/02/2019    HCT 43.6 11/02/2019    MCV 89 11/02/2019     11/02/2019           CMP  Sodium   Date Value Ref Range Status   11/02/2019 139 136 - 145 mmol/L Final     Potassium   Date Value Ref Range Status   11/02/2019 4.1 3.5 - 5.1 mmol/L Final     Chloride   Date Value Ref Range Status   11/02/2019 104 95 - 110 mmol/L Final     CO2   Date Value Ref Range Status   11/02/2019 22 (L) 23 - 29 mmol/L Final     Glucose   Date Value Ref Range Status   11/02/2019 119 (H) 70 - 110 mg/dL Final     BUN   Date Value Ref Range Status   11/02/2019 10 8 - 23 mg/dL Final     Creatinine   Date Value Ref Range Status   11/02/2019 0.8 0.5 - 1.4 mg/dL Final     Calcium   Date Value Ref Range Status   11/02/2019 10.7 (H) 8.7 - 10.5 mg/dL Final     Total Protein   Date Value Ref Range Status   11/02/2019 7.6 6.0 - 8.4 g/dL Final     Albumin   Date Value Ref Range Status   11/02/2019 4.3 3.5 - 5.2 g/dL Final     Total Bilirubin   Date Value Ref Range Status   11/02/2019 0.7 0.1 - 1.0 mg/dL Final     Comment:     For infants and newborns, interpretation of results should be based  on gestational age, weight and in agreement with clinical  observations.  Premature Infant recommended reference ranges:  Up to 24 hours.............<8.0 mg/dL  Up to 48 hours............<12.0 mg/dL  3-5 days..................<15.0 mg/dL  6-29  days.................<15.0 mg/dL       Alkaline Phosphatase   Date Value Ref Range Status   11/02/2019 82 55 - 135 U/L Final     AST   Date Value Ref Range Status   11/02/2019 25 10 - 40 U/L Final     ALT   Date Value Ref Range Status   11/02/2019 19 10 - 44 U/L Final     Anion Gap   Date Value Ref Range Status   11/02/2019 13 8 - 16 mmol/L Final     eGFR if    Date Value Ref Range Status   11/02/2019 >60 >60 mL/min/1.73 m^2 Final     eGFR if non    Date Value Ref Range Status   11/02/2019 >60 >60 mL/min/1.73 m^2 Final     Comment:     Calculation used to obtain the estimated glomerular filtration  rate (eGFR) is the CKD-EPI equation.          No results found for: TSH    No results found for: LABA1C, HGBA1C    The ASCVD Risk score (Arjun SLOAN, et al., 2019) failed to calculate for the following reasons:    The 2019 ASCVD risk score is only valid for ages 40 to 79    CrCl cannot be calculated (Patient's most recent lab result is older than the maximum 7 days allowed.).      ASSESSMENT/PLAN:       Low blood sugar  -     Hemoglobin A1C; Future; Expected date: 11/04/2022  -     POCT Glucose, Hand-Held Device    Hyperlipidemia, unspecified hyperlipidemia type  -     Lipid Panel; Future; Expected date: 11/04/2022    Mood change  -     TSH; Future; Expected date: 11/04/2022    Fatigue, unspecified type  -     CBC Auto Differential; Future; Expected date: 11/04/2022  -     Comprehensive Metabolic Panel; Future; Expected date: 11/04/2022    Encounter for annual health examination    Screening for HIV (human immunodeficiency virus)  -     HIV 1/2 Ag/Ab (4th Gen); Future; Expected date: 11/04/2022    Encounter for hepatitis C screening test for low risk patient  -     Hepatitis C Antibody; Future; Expected date: 11/04/2022    Encounter for immunization  -     Influenza (FLUAD) - Quadrivalent (Adjuvanted) *Preferred* (65+) (PF)    Right ankle tendonitis     Patient here for evaluation of reported  low blood sugars in the past.  POCT glucose during encounter 127.  Classically on metformin. Will hold for now until an A1c is checked.  Patient with a past medical history of HTN presents with /84.  She reports that she has discontinued losartan herself.  Advised patient to continue not taking losartan as her BP is in good control.  Consider restarting Ace/Arb if A1c reveals diabetes.  Patient also presented with unspecific fatigue as well as mood changes will investigate with CBC and TSH.  Flu vaccine given today.  Other labs ordered today.  Encouraged patient to continue to see Podiatry for the management of right ankle tendinitis.  Patient verbalized understanding and agreeable with plan.    - Colonoscopy: DONE - no due    (Grade A: adults 50-75; colonoscopy q10y or annual fecal immunochemical testing (FIT) as first-tier test; CT colonography q5y, FIT-fecal DNA testing q3y, or flexible sigmoidoscopy q5-10 years as second-tier tests; and capsule colonography q5y as a third-tier test)  - DM: Last A1c: DUE  (Grade B: adults 40-70 with BMI ?25; if normal, repeat q3y)  - If Diabetic:   - Foot Exam: N/A    - Eye Exam: N/A  - MMG: N/A  (Grade B: q1-2y for women 40-75; >75 after discussion on harms and benefits with patient)  - PAP: N/A  (Grade A: q3y with cervical cytology alone in women 21-29 years. For women 30-65 years, q3y with cervical cytology alone, q5y with high-risk HPV (hrHPV) testing alone, or q5y with hrHPV testing in combination with cytology)  - Statin: YES  (Grade B: adults 40-75 years with no history of CVD, ?1 CVD risk factors (dyslipidemia, DM, HTN, or smoking), and ASCVD ?10%)  - Flu: DUE - given  (All patients ?6 months, qyear)  - PCV 13: COMPLETED  (adults ?65 years; adults <64 years with HIV, asplenia, CKD, malignancy or solid organ transplant)  - PPSV 23: COMPLETED  (adults ?65 years; adults <64 years who smoke, long-term facility care resident, heart disease (ex. HTN), lung disease, liver  disease, DM or alcohol)   - If PPSV 23 is given, wait 1 year before giving PCV 13  - Shingles: COMPLETED  (adults ?50 years)  - HCV: DUE - ORDERED  (Grade B: screen all patients age 18-79)  - HIV: DUE - ORDERED  (Grade A: ages 15-65 years; ?66 if high-risk)  - DXA: DUE - will discuss at next visit  (Grade B: women ?65 years or post-menopausal women with risk-factors)  - LDCT: N/A  (Grade B: q1yr for adults 50-80 years with 20 PY and currently smoke or have quit ?15 years)  - US abdomen: N/A   (Grade B: one-time screening for AAA in men 65-75 years who have ever smoked)  - ASA: N/A  (Grade B: low dose ASA for adults 50-59 years with a ?10% 10-year cardiovascular risk)  - Depression: N/A  (All adults)  - Fall risk: N/A  Get Up and Go Test (positive >30 seconds, or negative <20; get up, walk 10 feet, turn around and sit; adults ?65 years).  - Tobacco abuse: NEVER SMOKER  (Grade A: all adults)    DUE AT NEXT/FUTURE VISIT:  DEXA       Follow up: 1 month lab work results    Case discussed with staff: Dr. Gonzalez      This note was partially created using [email or web address] Voice Recognition software. Typographical and content errors may occur with this process. While efforts are made to detect and correct such errors, in some cases errors will persist. For this reason, wording in this document should be considered in the proper context and not strictly verbatim.

## 2022-11-06 PROBLEM — E78.5 HYPERLIPIDEMIA: Status: ACTIVE | Noted: 2022-11-06

## 2022-11-06 PROBLEM — M77.51 RIGHT ANKLE TENDONITIS: Status: ACTIVE | Noted: 2022-11-06

## 2022-11-06 PROBLEM — R53.83 FATIGUE: Status: ACTIVE | Noted: 2022-11-06

## 2022-11-07 ENCOUNTER — TELEPHONE (OUTPATIENT)
Dept: PODIATRY | Facility: CLINIC | Age: 82
End: 2022-11-07

## 2022-11-07 ENCOUNTER — OFFICE VISIT (OUTPATIENT)
Dept: PODIATRY | Facility: CLINIC | Age: 82
End: 2022-11-07
Payer: MEDICARE

## 2022-11-07 VITALS
BODY MASS INDEX: 29.26 KG/M2 | HEIGHT: 58 IN | SYSTOLIC BLOOD PRESSURE: 136 MMHG | DIASTOLIC BLOOD PRESSURE: 73 MMHG | HEART RATE: 79 BPM

## 2022-11-07 DIAGNOSIS — G89.29 CHRONIC HEEL PAIN, RIGHT: ICD-10-CM

## 2022-11-07 DIAGNOSIS — M79.671 CHRONIC HEEL PAIN, RIGHT: ICD-10-CM

## 2022-11-07 DIAGNOSIS — M72.2 PLANTAR FASCIITIS, RIGHT: Primary | ICD-10-CM

## 2022-11-07 DIAGNOSIS — M77.51 TENDONITIS OF ANKLE, RIGHT: ICD-10-CM

## 2022-11-07 PROCEDURE — 1159F PR MEDICATION LIST DOCUMENTED IN MEDICAL RECORD: ICD-10-PCS | Mod: CPTII,S$GLB,, | Performed by: PODIATRIST

## 2022-11-07 PROCEDURE — 3078F PR MOST RECENT DIASTOLIC BLOOD PRESSURE < 80 MM HG: ICD-10-PCS | Mod: CPTII,S$GLB,, | Performed by: PODIATRIST

## 2022-11-07 PROCEDURE — 3078F DIAST BP <80 MM HG: CPT | Mod: CPTII,S$GLB,, | Performed by: PODIATRIST

## 2022-11-07 PROCEDURE — 1125F AMNT PAIN NOTED PAIN PRSNT: CPT | Mod: CPTII,S$GLB,, | Performed by: PODIATRIST

## 2022-11-07 PROCEDURE — 99213 OFFICE O/P EST LOW 20 MIN: CPT | Mod: S$GLB,,, | Performed by: PODIATRIST

## 2022-11-07 PROCEDURE — 1160F RVW MEDS BY RX/DR IN RCRD: CPT | Mod: CPTII,S$GLB,, | Performed by: PODIATRIST

## 2022-11-07 PROCEDURE — 3075F PR MOST RECENT SYSTOLIC BLOOD PRESS GE 130-139MM HG: ICD-10-PCS | Mod: CPTII,S$GLB,, | Performed by: PODIATRIST

## 2022-11-07 PROCEDURE — 99999 PR PBB SHADOW E&M-EST. PATIENT-LVL III: ICD-10-PCS | Mod: PBBFAC,,, | Performed by: PODIATRIST

## 2022-11-07 PROCEDURE — 3075F SYST BP GE 130 - 139MM HG: CPT | Mod: CPTII,S$GLB,, | Performed by: PODIATRIST

## 2022-11-07 PROCEDURE — 1125F PR PAIN SEVERITY QUANTIFIED, PAIN PRESENT: ICD-10-PCS | Mod: CPTII,S$GLB,, | Performed by: PODIATRIST

## 2022-11-07 PROCEDURE — 99999 PR PBB SHADOW E&M-EST. PATIENT-LVL III: CPT | Mod: PBBFAC,,, | Performed by: PODIATRIST

## 2022-11-07 PROCEDURE — 1159F MED LIST DOCD IN RCRD: CPT | Mod: CPTII,S$GLB,, | Performed by: PODIATRIST

## 2022-11-07 PROCEDURE — 99213 PR OFFICE/OUTPT VISIT, EST, LEVL III, 20-29 MIN: ICD-10-PCS | Mod: S$GLB,,, | Performed by: PODIATRIST

## 2022-11-07 PROCEDURE — 1160F PR REVIEW ALL MEDS BY PRESCRIBER/CLIN PHARMACIST DOCUMENTED: ICD-10-PCS | Mod: CPTII,S$GLB,, | Performed by: PODIATRIST

## 2022-11-07 NOTE — PROGRESS NOTES
"Subjective:      Patient ID: Millie Pineda is a 82 y.o. female.    Chief Complaint: Follow-up and Foot Pain (Right foot )    Presents today complaining of pain along the bottom of the right heel for the past month for so.  She was treated per her orthopedic spine specialist who performed an x-ray which showed a plantar heel spur and she received injection that gave her about a week of relief.  Relates pain mostly with standing or walking along the bottom the heel.  Denies any trauma or changes in activity.  She refused translation per [employer] on the [employer] services.    09/26/2022:  Follow-up for plantar fasciitis the right heel post corticosteroid injection.  States that she got approximately 3-4 days relief following the injection and then the pain gradually recurred.  She purchased new tennis shoes and has been trying to modify her activities.    11/07/2022:  Follow-up for plantar fasciitis the right foot and tendinitis involving the posterior tibial and peroneal tendons right ankle.  States that she wore the boot as advised and that she still uses at home.  She change her shoes and is wearing [employer] Go Walk shoes that were purchased by her daughter.  Relates some improvement to the pain mostly within the heel since the last visit.  Patient also relates she did not receive a phone call from the physical therapy department to schedule an appointment.    Vitals:    11/07/22 0905   BP: 136/73   Pulse: 79   Height: 4' 10" (1.473 m)   PainSc:   4   PainLoc: Foot      Past Medical History:   Diagnosis Date    Hypertension        History reviewed. No pertinent surgical history.    Family History   Problem Relation Age of Onset    No Known Problems Mother     No Known Problems Father        Social History     Socioeconomic History    Marital status:    Tobacco Use    Smoking status: Never    Smokeless tobacco: Never       Current Outpatient Medications   Medication Sig Dispense Refill    acetaminophen (TYLENOL) 500 " "MG tablet Take 1 tablet (500 mg total) by mouth every 6 (six) hours as needed for Pain. 20 tablet 0    atorvastatin (LIPITOR) 10 MG tablet Take 10 mg by mouth once daily.      diclofenac sodium (VOLTAREN) 1 % Gel SMARTSIG:Gram(s) Topical Twice Daily      gabapentin (NEURONTIN) 400 MG capsule Take 400 mg by mouth every evening.      vitamin D (VITAMIN D3) 1000 units Tab Take 1,000 Units by mouth once daily.      albuterol (PROVENTIL/VENTOLIN HFA) 90 mcg/actuation inhaler Inhale 1-2 puffs into the lungs every 6 (six) hours as needed for Shortness of Breath. Rescue 1 Inhaler 0     No current facility-administered medications for this visit.       Review of patient's allergies indicates:   Allergen Reactions    Pcn [penicillins] Other (See Comments)     " gives me headache"          Review of Systems   Constitutional: Negative for chills, fever and malaise/fatigue.   HENT:  Negative for congestion and hearing loss.    Cardiovascular:  Negative for chest pain, claudication and leg swelling.   Respiratory:  Negative for cough and shortness of breath.    Skin:  Negative for color change.   Musculoskeletal:  Positive for back pain. Negative for joint pain, muscle cramps and muscle weakness.   Gastrointestinal:  Negative for nausea and vomiting.   Neurological:  Negative for numbness, paresthesias and weakness.   Psychiatric/Behavioral:  Negative for altered mental status.          Objective:      Physical Exam  Constitutional:       General: She is not in acute distress.     Appearance: Normal appearance. She is not ill-appearing.   Cardiovascular:      Pulses:           Dorsalis pedis pulses are 2+ on the right side and 2+ on the left side.        Posterior tibial pulses are 2+ on the right side and 2+ on the left side.   Musculoskeletal:      Comments:   Localized pain on palpation to the plantar medial right heel overlying the calcaneal tuber at the plantar fascia origin.  Slight pain with medial and lateral squeeze test " of the right heel.  Pain on palpation overlying the course of the peroneal tendons commencing posterior to the lateral malleolus and extending to the lateral hindfoot.  There is some mild pain aggravated with active eversion of the right foot in the neutral plantar flexed position overlying the course of the peroneal tendons.  There is also pain on palpation overlying the posterior tibial tendon inferior to the medial malleolus and extending towards the navicular tuberosity.  No pain with active inversion of the right foot.  Negative anterior drawer sign right ankle.  Only slight pain with stress eversion of the right ankle and no pain with stress inversion.  Range of motion to the ankle bilateral is noted to have 0° of dorsiflexion with the knee extended and improves with knee flexion.  Rectus appearing foot type bilateral.   Skin:     General: Skin is warm.      Capillary Refill: Capillary refill takes less than 2 seconds.      Findings: No ecchymosis or erythema.      Nails: There is no clubbing.      Comments: Mild hypopigmentation of the skin plantar medial right heel from previous injection.   Neurological:      Mental Status: She is alert and oriented to person, place, and time.      Sensory: Sensation is intact.      Motor: Motor function is intact.             Assessment:       Encounter Diagnoses   Name Primary?    Plantar fasciitis, right Yes    Chronic heel pain, right     Tendonitis of ankle, right          Plan:       Millie was seen today for follow-up and foot pain.    Diagnoses and all orders for this visit:    Plantar fasciitis, right  -     Ambulatory referral/consult to Physical/Occupational Therapy; Future    Chronic heel pain, right  -     Ambulatory referral/consult to Physical/Occupational Therapy; Future    Tendonitis of ankle, right  -     Ambulatory referral/consult to Physical/Occupational Therapy; Future    I counseled the patient on her conditions, their implications and medical  management.    Patient with some persistent pain to the plantar right heel overlying the plantar fascia origin.  Another referral was placed to physical therapy and my staff will also send a secure message in order to get them to get in contact with the patient for scheduling purposes.    Inspected her shoes which lacks sufficient support.  We did discuss appropriate shoe gear characteristics in detail.      Continue rest, ice and elevation p.r.n..  She may continue using the orthopedic boot as needed.      RTC within 2 months or p.r.n. as discussed.  We discussed performing other steroid injection at the next visit if she does not have sufficient improvement in her symptoms.  We discussed the risks of receiving too  frequent of injection to the same area.    A portion of this note was generated by voice recognition software and may contain spelling and grammar errors.      .

## 2022-11-23 NOTE — PROGRESS NOTES
I assume primary medical responsibility for this patient. I have reviewed the history, physical, and assessement & treatment plan with the resident and agree that the care is reasonable and necessary. This service has been performed by a resident without the presence of a teaching physician under the primary care exception. If necessary, an addendum of additional findings or evaluation beyond the resident documentation will be noted below.     Pham Gonzalez MD

## 2023-06-14 ENCOUNTER — HOSPITAL ENCOUNTER (EMERGENCY)
Facility: HOSPITAL | Age: 83
Discharge: HOME | End: 2023-06-14
Attending: EMERGENCY MEDICINE
Payer: MEDICARE

## 2023-06-14 VITALS
TEMPERATURE: 98 F | DIASTOLIC BLOOD PRESSURE: 80 MMHG | HEART RATE: 86 BPM | RESPIRATION RATE: 20 BRPM | OXYGEN SATURATION: 100 % | SYSTOLIC BLOOD PRESSURE: 150 MMHG

## 2023-06-14 DIAGNOSIS — E78.5 HYPERLIPIDEMIA, UNSPECIFIED HYPERLIPIDEMIA TYPE: Primary | ICD-10-CM

## 2023-06-14 DIAGNOSIS — M54.41 ACUTE RIGHT-SIDED LOW BACK PAIN WITH RIGHT-SIDED SCIATICA: ICD-10-CM

## 2023-06-14 PROCEDURE — 96372 THER/PROPH/DIAG INJ SC/IM: CPT | Performed by: EMERGENCY MEDICINE

## 2023-06-14 PROCEDURE — 99284 EMERGENCY DEPT VISIT MOD MDM: CPT

## 2023-06-14 PROCEDURE — 63600175 PHARM REV CODE 636 W HCPCS: Performed by: EMERGENCY MEDICINE

## 2023-06-14 PROCEDURE — 25000003 PHARM REV CODE 250: Performed by: EMERGENCY MEDICINE

## 2023-06-14 RX ORDER — DEXAMETHASONE SODIUM PHOSPHATE 4 MG/ML
8 INJECTION, SOLUTION INTRA-ARTICULAR; INTRALESIONAL; INTRAMUSCULAR; INTRAVENOUS; SOFT TISSUE
Status: COMPLETED | OUTPATIENT
Start: 2023-06-14 | End: 2023-06-14

## 2023-06-14 RX ORDER — CYCLOBENZAPRINE HCL 10 MG
10 TABLET ORAL EVERY 8 HOURS PRN
Qty: 14 TABLET | Refills: 0 | Status: SHIPPED | OUTPATIENT
Start: 2023-06-14 | End: 2023-06-19

## 2023-06-14 RX ORDER — LIDOCAINE 50 MG/G
1 PATCH TOPICAL DAILY
Qty: 15 PATCH | Refills: 0 | Status: SHIPPED | OUTPATIENT
Start: 2023-06-14

## 2023-06-14 RX ORDER — LIDOCAINE 50 MG/G
1 PATCH TOPICAL ONCE
Status: DISCONTINUED | OUTPATIENT
Start: 2023-06-14 | End: 2023-06-14 | Stop reason: HOSPADM

## 2023-06-14 RX ORDER — PROCHLORPERAZINE EDISYLATE 5 MG/ML
10 INJECTION INTRAMUSCULAR; INTRAVENOUS ONCE
Status: COMPLETED | OUTPATIENT
Start: 2023-06-14 | End: 2023-06-14

## 2023-06-14 RX ADMIN — LIDOCAINE 1 PATCH: 50 PATCH CUTANEOUS at 05:06

## 2023-06-14 RX ADMIN — PROCHLORPERAZINE EDISYLATE 10 MG: 5 INJECTION INTRAMUSCULAR; INTRAVENOUS at 05:06

## 2023-06-14 RX ADMIN — DEXAMETHASONE SODIUM PHOSPHATE 8 MG: 4 INJECTION, SOLUTION INTRA-ARTICULAR; INTRALESIONAL; INTRAMUSCULAR; INTRAVENOUS; SOFT TISSUE at 05:06

## 2023-06-14 NOTE — ED NOTES
Pt ambulated in with slow, steady gait with her daughter. Pt c/o right hip pain that radiates down right leg. Pt denies any injury or trauma. No swelling or deformity. Plan of care reviewed.

## 2023-06-14 NOTE — DISCHARGE INSTRUCTIONS
I recommend taking ibuprofen 600 mg every 6 hours with food and/or Tylenol 650 mg every 6 hours as needed in addition to the prescribed medication for pain.  Please follow-up with primary care physician for further evaluation and management.  Please return with any new or worsening symptoms.

## 2023-06-14 NOTE — ED PROVIDER NOTES
"Encounter Date: 6/14/2023       History     Chief Complaint   Patient presents with    Hip Pain     Right hip pain not relieved with OTC medications. Patient stated the pain shoots down her leg no numbness or tingling, no trauma noted. Pain is better in am but worsens through the day     83-year-old female presents emergency department complaining of right low back pain radiating down the posterolateral aspect of her leg.  Onset weeks ago.  States symptoms have worsened over the past few days.  Denies any recent trauma.  Does get some mild relief with Tylenol.  Denies any focal numbness or weakness, loss of continence, difficulty urinating or defecating, or saddle paresthesias.     Review of patient's allergies indicates:   Allergen Reactions    Pcn [penicillins] Other (See Comments)     " gives me headache"      Past Medical History:   Diagnosis Date    Hypertension      History reviewed. No pertinent surgical history.  Family History   Problem Relation Age of Onset    No Known Problems Mother     No Known Problems Father      Social History     Tobacco Use    Smoking status: Never    Smokeless tobacco: Never     Review of Systems   Constitutional:  Negative for chills and fever.   HENT:  Negative for congestion.    Respiratory:  Negative for cough and shortness of breath.    Cardiovascular:  Negative for chest pain.   Gastrointestinal:  Negative for abdominal pain.   Musculoskeletal:  Positive for back pain.   Neurological:  Negative for light-headedness and headaches.     Physical Exam     Initial Vitals [06/14/23 1516]   BP Pulse Resp Temp SpO2   (!) 150/80 86 20 98 °F (36.7 °C) 100 %      MAP       --         Physical Exam    Nursing note and vitals reviewed.  Constitutional: She appears well-developed and well-nourished. No distress.   HENT:   Head: Normocephalic and atraumatic.   Eyes: Conjunctivae and EOM are normal. Pupils are equal, round, and reactive to light.   Neck: Neck supple. No tracheal deviation " present.   Normal range of motion.  Cardiovascular:  Normal rate and intact distal pulses.           Pulmonary/Chest: No respiratory distress.   Musculoskeletal:         General: Tenderness (Mild right low back tenderness, no point or bony tenderness) present. No edema. Normal range of motion.      Cervical back: Normal range of motion and neck supple.     Neurological: She is alert and oriented to person, place, and time. She has normal strength. No cranial nerve deficit. GCS score is 15. GCS eye subscore is 4. GCS verbal subscore is 5. GCS motor subscore is 6.   Skin: Skin is warm and dry.       ED Course   Procedures  Labs Reviewed - No data to display              Medications   LIDOcaine 5 % patch 1 patch (1 patch Transdermal Patch Applied 6/14/23 1708)   prochlorperazine injection Soln 10 mg (10 mg Intramuscular Given 6/14/23 1707)   dexAMETHasone injection 8 mg (8 mg Intramuscular Given 6/14/23 1707)     Medical Decision Making:   History:   Old Medical Records: I decided to obtain old medical records.  Old Records Summarized: records from clinic visits.       <> Summary of Records: Reviewed most recent PCP visit documenting baseline medications and past medical history  Initial Assessment:   83-year-old female presents to the emergency department complaining right low back pain radiating down her right leg  Differential Diagnosis:   Sprain, strain, cauda equina, radiculopathy  ED Management:  Patient given IM Decadron and Compazine as well as a Lidoderm patch.  On re-evaluation she reports moderate improvement in symptoms.  I have placed a referral for a primary care physician visit and informed her of plan to discharge with prescriptions for Flexeril and Lidoderm, instructed on home management, over-the-counter medications, follow-up with primary care physician, strict return precautions given.  Vital signs stable.  Patient comfortable with discharge at this time.                         Clinical Impression:    Final diagnoses:  [E78.5] Hyperlipidemia, unspecified hyperlipidemia type (Primary)  [M54.41] Acute right-sided low back pain with right-sided sciatica        ED Disposition Condition    Discharge Stable          ED Prescriptions       Medication Sig Dispense Start Date End Date Auth. Provider    cyclobenzaprine (FLEXERIL) 10 MG tablet Take 1 tablet (10 mg total) by mouth every 8 (eight) hours as needed for Muscle spasms. 14 tablet 6/14/2023 6/19/2023 Lebron Jara MD    LIDOcaine (LIDODERM) 5 % Place 1 patch onto the skin once daily. Remove & Discard patch within 12 hours or as directed by MD 15 patch 6/14/2023 -- Lebron Jara MD          Follow-up Information       Follow up With Specialties Details Why Contact Info    Chacorta Santana MD Internal Medicine Schedule an appointment as soon as possible for a visit   [address] [facility]  [facility]  [place] [id number]  [phone]               Lebron Jara MD  06/14/23 5158

## 2023-06-14 NOTE — FIRST PROVIDER EVALUATION
" Emergency Department TeleTriage Encounter Note      CHIEF COMPLAINT    Chief Complaint   Patient presents with    Hip Pain     Right hip pain not relieved with OTC medications. Patient stated the pain shoots down her leg no numbness or tingling, no trauma noted. Pain is better in am but worsens through the day       VITAL SIGNS   Initial Vitals [06/14/23 1516]   BP Pulse Resp Temp SpO2   (!) 150/80 86 20 98 °F (36.7 °C) 100 %      MAP       --            ALLERGIES    Review of patient's allergies indicates:   Allergen Reactions    Pcn [penicillins] Other (See Comments)     " gives me headache"        PROVIDER TRIAGE NOTE  Patient presents with complaint of atraumatic right hip pian going down leg. Patient is Wolof speaking and unable to tell me if numbness/tingling or loss of bowel/bladder control.      Phy:   Constitutional: well nourished, well developed, appearing stated age, NAD   HEENT: NCAT, symmetrical lids, No obvious facial deformity.  Normal phonation. Normal Conjunctiva   Neck: NAROM   Respiratory: Normal effort.  No obvious use of accessory muscles   Musculoskeletal: Moved upper extremities well   Neuro: Alert, answers questions appropriately    Psych: appropriate mood and affect      Initial orders will be placed and care will be transferred to an alternate provider when patient is roomed for a full evaluation. Any additional orders and the final disposition will be determined by that provider.        ORDERS  Labs Reviewed - No data to display    ED Orders (720h ago, onward)      None              Virtual Visit Note: The provider triage portion of this emergency department evaluation and documentation was performed via [employer], a HIPAA-compliant telemedicine application, in concert with a tele-presenter in the room. A face to face patient evaluation with one of my colleagues will occur once the patient is placed in an emergency department room.      DISCLAIMER: This note was prepared with " M[employer]Modal voice recognition transcription software. Garbled syntax, mangled pronouns, and other bizarre constructions may be attributed to that software system.

## 2024-01-04 ENCOUNTER — HOSPITAL ENCOUNTER (EMERGENCY)
Facility: HOSPITAL | Age: 84
Discharge: HOME | End: 2024-01-04
Attending: EMERGENCY MEDICINE
Payer: MEDICARE

## 2024-01-04 VITALS
HEIGHT: 56 IN | RESPIRATION RATE: 18 BRPM | OXYGEN SATURATION: 95 % | DIASTOLIC BLOOD PRESSURE: 83 MMHG | WEIGHT: 130 LBS | SYSTOLIC BLOOD PRESSURE: 163 MMHG | HEART RATE: 88 BPM | TEMPERATURE: 98 F | BODY MASS INDEX: 29.25 KG/M2

## 2024-01-04 DIAGNOSIS — J06.9 VIRAL URI WITH COUGH: Primary | ICD-10-CM

## 2024-01-04 LAB
BACTERIA #/AREA URNS HPF: ABNORMAL /HPF
BILIRUB UR QL STRIP: NEGATIVE
CLARITY UR: CLEAR
COLOR UR: COLORLESS
GLUCOSE UR QL STRIP: NEGATIVE
HGB UR QL STRIP: NEGATIVE
INFLUENZA A, MOLECULAR: NEGATIVE
INFLUENZA B, MOLECULAR: NEGATIVE
KETONES UR QL STRIP: NEGATIVE
LEUKOCYTE ESTERASE UR QL STRIP: ABNORMAL
MICROSCOPIC COMMENT: ABNORMAL
NITRITE UR QL STRIP: NEGATIVE
PH UR STRIP: 7 [PH] (ref 5–8)
PROT UR QL STRIP: NEGATIVE
RBC #/AREA URNS HPF: 2 /HPF (ref 0–4)
SARS-COV-2 RDRP RESP QL NAA+PROBE: NEGATIVE
SP GR UR STRIP: 1.01 (ref 1–1.03)
SPECIMEN SOURCE: NORMAL
SQUAMOUS #/AREA URNS HPF: 2 /HPF
URN SPEC COLLECT METH UR: ABNORMAL
UROBILINOGEN UR STRIP-ACNC: NEGATIVE EU/DL
WBC #/AREA URNS HPF: 11 /HPF (ref 0–5)

## 2024-01-04 PROCEDURE — 87088 URINE BACTERIA CULTURE: CPT

## 2024-01-04 PROCEDURE — 87186 SC STD MICRODIL/AGAR DIL: CPT | Mod: 59

## 2024-01-04 PROCEDURE — 25000003 PHARM REV CODE 250

## 2024-01-04 PROCEDURE — 87077 CULTURE AEROBIC IDENTIFY: CPT | Mod: 59

## 2024-01-04 PROCEDURE — 87502 INFLUENZA DNA AMP PROBE: CPT

## 2024-01-04 PROCEDURE — 87086 URINE CULTURE/COLONY COUNT: CPT

## 2024-01-04 PROCEDURE — 63600175 PHARM REV CODE 636 W HCPCS

## 2024-01-04 PROCEDURE — U0002 COVID-19 LAB TEST NON-CDC: HCPCS

## 2024-01-04 PROCEDURE — 96374 THER/PROPH/DIAG INJ IV PUSH: CPT

## 2024-01-04 PROCEDURE — 81000 URINALYSIS NONAUTO W/SCOPE: CPT

## 2024-01-04 PROCEDURE — 99284 EMERGENCY DEPT VISIT MOD MDM: CPT | Mod: 25

## 2024-01-04 RX ORDER — NITROFURANTOIN 25; 75 MG/1; MG/1
100 CAPSULE ORAL 2 TIMES DAILY
Qty: 10 CAPSULE | Refills: 0 | Status: SHIPPED | OUTPATIENT
Start: 2024-01-04 | End: 2024-01-07 | Stop reason: ALTCHOICE

## 2024-01-04 RX ORDER — KETOROLAC TROMETHAMINE 30 MG/ML
15 INJECTION, SOLUTION INTRAMUSCULAR; INTRAVENOUS
Status: COMPLETED | OUTPATIENT
Start: 2024-01-04 | End: 2024-01-04

## 2024-01-04 RX ORDER — BENZONATATE 100 MG/1
100 CAPSULE ORAL 3 TIMES DAILY PRN
Qty: 20 CAPSULE | Refills: 0 | Status: SHIPPED | OUTPATIENT
Start: 2024-01-04 | End: 2024-01-14

## 2024-01-04 RX ADMIN — KETOROLAC TROMETHAMINE 15 MG: 30 INJECTION, SOLUTION INTRAMUSCULAR; INTRAVENOUS at 04:01

## 2024-01-04 RX ADMIN — SODIUM CHLORIDE 500 ML: 9 INJECTION, SOLUTION INTRAVENOUS at 04:01

## 2024-01-04 NOTE — ED TRIAGE NOTES
Cough and congestion since yesterday with headache and body aches. Taking Rx cough med with relief - requesting refill. + subjective fever. No SOB, N/V. Presents awake, alert.

## 2024-01-04 NOTE — ED PROVIDER NOTES
"Encounter Date: 1/4/2024       History     Chief Complaint   Patient presents with    Cough     Cough, nasal congestion  and headache since yesterday. Tylenol and Rx cough medications have been moderately effective. Would like a referral for PCP.      83-year-old female with  past medical history of hypertension presents to the ED with URI symptoms x1 day.  Associated symptoms include body aches, congestion, rhinorrhea, sore throat, dry cough, and headaches.  Has been using Tylenol and promethazine with codeine with mild relief of her symptoms.  Notes frontal headaches.  No vision changes or photophobia.  No recent injuries/ trauma of the head.  States intermittent headaches are associated with her URI symptoms.  Unsure recent sick contacts.  Reports new onset of dysuria but denies urinary frequency or hematuria.  No fever, nausea, vomiting, chest pain, shortness of breath, abdominal pain, diarrhea/constipation.  No other acute complaints today.    The history is provided by the patient. No  was used.     Review of patient's allergies indicates:   Allergen Reactions    Pcn [penicillins] Other (See Comments)     " gives me headache"      Past Medical History:   Diagnosis Date    Hypertension      No past surgical history on file.  Family History   Problem Relation Age of Onset    No Known Problems Mother     No Known Problems Father      Social History     Tobacco Use    Smoking status: Never    Smokeless tobacco: Never     Review of Systems   Constitutional:  Positive for fatigue. Negative for appetite change, chills and fever.   HENT:  Positive for congestion, rhinorrhea and sore throat. Negative for sinus pressure and sinus pain.    Eyes:  Negative for photophobia and redness.   Respiratory:  Positive for cough. Negative for chest tightness, shortness of breath and wheezing.    Cardiovascular:  Negative for chest pain and leg swelling.   Gastrointestinal:  Negative for abdominal pain, nausea " and vomiting.   Genitourinary:  Positive for dysuria. Negative for flank pain, frequency and hematuria.   Musculoskeletal:  Positive for myalgias. Negative for neck pain and neck stiffness.   Neurological:  Positive for headaches. Negative for dizziness, weakness, light-headedness and numbness.       Physical Exam     Initial Vitals [01/04/24 1309]   BP Pulse Resp Temp SpO2   (!) 163/83 88 18 97.9 °F (36.6 °C) 95 %      MAP       --         Physical Exam    Constitutional: She appears well-developed and well-nourished. She is not diaphoretic. No distress.   HENT:   Head: Normocephalic and atraumatic.   Right Ear: External ear normal.   Left Ear: External ear normal.   Mouth/Throat: Oropharynx is clear and moist.   Posterior oropharyngeal without tonsillar exudates.  Uvula is midline.    Right enlarged nasal turbinates.  Clear nasal drainage noted bilaterally.   No tenderness to palpation over frontal and maxillary sinuses.   Eyes: EOM are normal. Pupils are equal, round, and reactive to light.   Neck: Neck supple.   Normal range of motion.  Cardiovascular:  Normal rate and normal heart sounds.           Pulmonary/Chest: Breath sounds normal. No respiratory distress. She has no wheezes. She has no rales.   Abdominal: Abdomen is soft. Bowel sounds are normal. She exhibits no distension. There is no abdominal tenderness. There is no rebound.   Musculoskeletal:         General: No tenderness. Normal range of motion.      Cervical back: Normal range of motion and neck supple.     Neurological: She is alert and oriented to person, place, and time. GCS score is 15. GCS eye subscore is 4. GCS verbal subscore is 5. GCS motor subscore is 6.   Skin: Skin is warm. Capillary refill takes less than 2 seconds.   Psychiatric: She has a normal mood and affect. Her behavior is normal. Judgment and thought content normal.         ED Course   Procedures  Labs Reviewed   URINALYSIS, REFLEX TO URINE CULTURE - Abnormal; Notable for the  following components:       Result Value    Color, UA Colorless (*)     Leukocytes, UA 2+ (*)     All other components within normal limits    Narrative:     Specimen Source->Urine   URINALYSIS MICROSCOPIC - Abnormal; Notable for the following components:    WBC, UA 11 (*)     Bacteria Few (*)     All other components within normal limits    Narrative:     Specimen Source->Urine   INFLUENZA A & B BY MOLECULAR   CULTURE, URINE   SARS-COV-2 RNA AMPLIFICATION, QUAL          Imaging Results    None          Medications   sodium chloride 0.9% bolus 500 mL 500 mL (500 mLs Intravenous New Bag 1/4/24 1608)   ketorolac injection 15 mg (15 mg Intravenous Given 1/4/24 1609)     Medical Decision Making  Differential Diagnosis includes, but is not limited to:  Ischemic stroke, hemorrhagic stroke, subarachnoid hemorrhage/ruptured aneurysm, intracranial lesion/mass, meningitis/encephalitis, epidural hematoma, subdural hematoma, pseudotumor cerebri, venous sinus thrombosis, CO poisoning, hypertensive encephalopathy, MI/ACS, head trauma/contusion, concussion, sinus headache, dehydration, anxiety, medication non-compliance, primary headache (tension/cluster/migraine), Sepsis, meningitis, cavernous sinus thrombosis, nasal foreign body, otitis media/external, nasal polyp, bacterial sinusitis, allergic rhinitis, influenza, bacterial/viral pharyngitis, peritonsillar abscess, retropharyngeal abscess, bacterial/viral pneumonia.      ED management     83-year-old female with past medical history of hypertension presents to the ED with URI symptoms x1 day.  Patient is not toxic appearing, hemodynamically stable and resting comfortably on bed. Afebrile with vitals WNL. No distress on exam.  Physical exam as stated above. Lab results discussed in ED course.  UA significant for leukocytes but not nitrates.  Patient with history of UTIs requested antibiotics in case symptoms worsen.  My overall impression is viral URI with cough.  Patient was  given Toradol and IV fluids with some improvement of her symptoms.  Based on clinical presentation, physical exam and onset of symptoms, I do not suspect pneumonia or bronchitis this time.  Pt advised to take ibuprofen/Tylenol as needed.  Will prescribe Keflex and Tessalon Perles.  Patient stable at discharge.    I have discussed the specifics of the workup with the patient and the patient has verbalized understanding of the details of the workup, the diagnosis, the treatment plan, and the need for outpatient follow-up with PCP. ED precautions given. Discussed with pt about returning to the ED, if symptoms fail to improve or worsen.   RESULTS:  Documented in ED course.  Labs/ekg interpreted by myself               Amount and/or Complexity of Data Reviewed  Labs:  Decision-making details documented in ED Course.    Risk  Prescription drug management.               ED Course as of 01/04/24 1942 Thu Jan 04, 2024   1523 COVID-19 Rapid Screening  negative [NW]   1523 Influenza A & B by Molecular  negative [NW]   1611 Urinalysis, Reflex to Urine Culture Urine, Clean Catch [NW]   1634 Urinalysis, Reflex to Urine Culture Urine, Clean Catch(!)  2+ leukocytes.  No nitrates, no ketones.  Asymptomatic. [NW]      ED Course User Index  [NW] Ryann Becker PA-C                           Clinical Impression:  Final diagnoses:  [J06.9] Viral URI with cough (Primary)          ED Disposition Condition    Discharge Stable          ED Prescriptions       Medication Sig Dispense Start Date End Date Auth. Provider    benzonatate (TESSALON) 100 MG capsule Take 1 capsule (100 mg total) by mouth 3 (three) times daily as needed for Cough. 20 capsule 1/4/2024 1/14/2024 Ryann Becker PA-C    nitrofurantoin, macrocrystal-monohydrate, (MACROBID) 100 MG capsule Take 1 capsule (100 mg total) by mouth 2 (two) times daily. for 5 days 10 capsule 1/4/2024 1/9/2024 Ryann Bekcer PA-C          Follow-up Information       Follow up With Specialties  Details Why Contact Info Additional Information    Chacorta Santana MD Internal Medicine Schedule an appointment as soon as possible for a visit in 2 days As needed, If symptoms worsen [address] [facility] [address]  [address] [address] [address]  [place] [address]  [phone]       [facility] Family Medicine Schedule an appointment as soon as possible for a visit in 2 days As needed, If symptoms worsen [address], [address]  [facility] [address] [phone] Please park in [address] or [address] and use [address]. Take Medical Office Bldg. elevators.             Ryann Becker PA-C  01/04/24 1942

## 2024-01-04 NOTE — DISCHARGE INSTRUCTIONS
Jess por dejarme cuidar de ti en el ananda de hoy! Fue un placer conocerte, y espero que te sientas mejor pronto. Tambien aqui le dejo informaciones/ instrucciones sobre connolly plan/ tratamiento médico:  Si desea acceder connolly expediente médico y lo que se hizo hoy, utilice la aplicación de [facility]. No dude en regresar si ruth síntomas empeoran o si desarrolla cualquier otro síntoma preocupante.     Nuestro objetivo en el departamento de emergencias es siempre brindarle adelia atención y un servicio excepcional. Es posible que reciba adelia encuesta por correo postal o electrónico en la  próxima semana con respecto a connolly experiencia en nuestra alesha de emergencias. Le agradeceria mucho si completara la encuesta compartiendo connolly experiencia con nosotros. Ruth comentarios nos proporcionan adelia manera de reconocer a nuestro personal que [place] muy buena atencion y ayuda a nuestros queridos pacientes, al igual nos ayuda aprender alexa mejorar el cuidado y servicio que ofrecemos debajo de nuestra aspiración de excelencia.    Atentamente,    Ryann Becker PA-C  [facility] del [facility]artamento de Emergen[facility]  [facility], [place] [facility] [place]

## 2024-01-07 LAB — BACTERIA UR CULT: ABNORMAL
